# Patient Record
Sex: MALE | Race: BLACK OR AFRICAN AMERICAN | NOT HISPANIC OR LATINO | Employment: OTHER | URBAN - METROPOLITAN AREA
[De-identification: names, ages, dates, MRNs, and addresses within clinical notes are randomized per-mention and may not be internally consistent; named-entity substitution may affect disease eponyms.]

---

## 2023-10-29 ENCOUNTER — APPOINTMENT (OUTPATIENT)
Dept: CT IMAGING | Facility: CLINIC | Age: 63
End: 2023-10-29
Attending: PHYSICIAN ASSISTANT
Payer: COMMERCIAL

## 2023-10-29 ENCOUNTER — HOSPITAL ENCOUNTER (OUTPATIENT)
Facility: CLINIC | Age: 63
Setting detail: OBSERVATION
Discharge: HOME | End: 2023-10-31
Attending: EMERGENCY MEDICINE | Admitting: EMERGENCY MEDICINE
Payer: COMMERCIAL

## 2023-10-29 DIAGNOSIS — Z79.4 TYPE 2 DIABETES MELLITUS WITH HYPOGLYCEMIA WITHOUT COMA, WITH LONG-TERM CURRENT USE OF INSULIN (H): Primary | ICD-10-CM

## 2023-10-29 DIAGNOSIS — E11.649 TYPE 2 DIABETES MELLITUS WITH HYPOGLYCEMIA WITHOUT COMA, WITH LONG-TERM CURRENT USE OF INSULIN (H): Primary | ICD-10-CM

## 2023-10-29 DIAGNOSIS — E16.2 HYPOGLYCEMIA: ICD-10-CM

## 2023-10-29 DIAGNOSIS — R56.9 SEIZURE (H): ICD-10-CM

## 2023-10-29 LAB
ALBUMIN SERPL BCG-MCNC: 4.6 G/DL (ref 3.5–5.2)
ALBUMIN UR-MCNC: 10 MG/DL
ALP SERPL-CCNC: 123 U/L (ref 40–129)
ALT SERPL W P-5'-P-CCNC: 23 U/L (ref 0–70)
ANION GAP SERPL CALCULATED.3IONS-SCNC: 23 MMOL/L (ref 7–15)
APPEARANCE UR: CLEAR
AST SERPL W P-5'-P-CCNC: 16 U/L (ref 0–45)
ATRIAL RATE - MUSE: 85 BPM
BASOPHILS # BLD AUTO: 0.1 10E3/UL (ref 0–0.2)
BASOPHILS NFR BLD AUTO: 1 %
BILIRUB SERPL-MCNC: 0.3 MG/DL
BILIRUB UR QL STRIP: NEGATIVE
BUN SERPL-MCNC: 21.7 MG/DL (ref 8–23)
CALCIUM SERPL-MCNC: 9.6 MG/DL (ref 8.8–10.2)
CHLORIDE SERPL-SCNC: 100 MMOL/L (ref 98–107)
COLOR UR AUTO: ABNORMAL
CREAT SERPL-MCNC: 1.5 MG/DL (ref 0.67–1.17)
DEPRECATED HCO3 PLAS-SCNC: 17 MMOL/L (ref 22–29)
DIASTOLIC BLOOD PRESSURE - MUSE: NORMAL MMHG
EGFRCR SERPLBLD CKD-EPI 2021: 52 ML/MIN/1.73M2
EOSINOPHIL # BLD AUTO: 0.1 10E3/UL (ref 0–0.7)
EOSINOPHIL NFR BLD AUTO: 1 %
ERYTHROCYTE [DISTWIDTH] IN BLOOD BY AUTOMATED COUNT: 13.2 % (ref 10–15)
GLUCOSE BLDC GLUCOMTR-MCNC: 130 MG/DL (ref 70–99)
GLUCOSE BLDC GLUCOMTR-MCNC: 143 MG/DL (ref 70–99)
GLUCOSE SERPL-MCNC: 90 MG/DL (ref 70–99)
GLUCOSE UR STRIP-MCNC: NEGATIVE MG/DL
HCT VFR BLD AUTO: 57.6 % (ref 40–53)
HGB BLD-MCNC: 17.3 G/DL (ref 13.3–17.7)
HGB UR QL STRIP: NEGATIVE
IMM GRANULOCYTES # BLD: 0.1 10E3/UL
IMM GRANULOCYTES NFR BLD: 1 %
INTERPRETATION ECG - MUSE: NORMAL
KETONES UR STRIP-MCNC: NEGATIVE MG/DL
LEUKOCYTE ESTERASE UR QL STRIP: NEGATIVE
LYMPHOCYTES # BLD AUTO: 3.4 10E3/UL (ref 0.8–5.3)
LYMPHOCYTES NFR BLD AUTO: 31 %
MAGNESIUM SERPL-MCNC: 2.6 MG/DL (ref 1.7–2.3)
MCH RBC QN AUTO: 27.5 PG (ref 26.5–33)
MCHC RBC AUTO-ENTMCNC: 30 G/DL (ref 31.5–36.5)
MCV RBC AUTO: 92 FL (ref 78–100)
MONOCYTES # BLD AUTO: 0.8 10E3/UL (ref 0–1.3)
MONOCYTES NFR BLD AUTO: 7 %
MUCOUS THREADS #/AREA URNS LPF: PRESENT /LPF
NEUTROPHILS # BLD AUTO: 6.5 10E3/UL (ref 1.6–8.3)
NEUTROPHILS NFR BLD AUTO: 59 %
NITRATE UR QL: NEGATIVE
NRBC # BLD AUTO: 0 10E3/UL
NRBC BLD AUTO-RTO: 0 /100
P AXIS - MUSE: 55 DEGREES
PH UR STRIP: 5.5 [PH] (ref 5–7)
PLATELET # BLD AUTO: 297 10E3/UL (ref 150–450)
POTASSIUM SERPL-SCNC: 3.5 MMOL/L (ref 3.4–5.3)
PR INTERVAL - MUSE: 220 MS
PROT SERPL-MCNC: 7.6 G/DL (ref 6.4–8.3)
QRS DURATION - MUSE: 78 MS
QT - MUSE: 374 MS
QTC - MUSE: 445 MS
R AXIS - MUSE: 32 DEGREES
RBC # BLD AUTO: 6.29 10E6/UL (ref 4.4–5.9)
RBC URINE: 1 /HPF
SODIUM SERPL-SCNC: 140 MMOL/L (ref 135–145)
SP GR UR STRIP: 1.01 (ref 1–1.03)
SPERM #/AREA URNS HPF: PRESENT /HPF
SYSTOLIC BLOOD PRESSURE - MUSE: NORMAL MMHG
T AXIS - MUSE: 58 DEGREES
TROPONIN T SERPL HS-MCNC: 37 NG/L
TROPONIN T SERPL HS-MCNC: 39 NG/L
UROBILINOGEN UR STRIP-MCNC: NORMAL MG/DL
VENTRICULAR RATE- MUSE: 85 BPM
WBC # BLD AUTO: 10.9 10E3/UL (ref 4–11)
WBC URINE: 1 /HPF

## 2023-10-29 PROCEDURE — G0378 HOSPITAL OBSERVATION PER HR: HCPCS

## 2023-10-29 PROCEDURE — 99207 PR APP CREDIT; MD BILLING SHARED VISIT: CPT | Performed by: INTERNAL MEDICINE

## 2023-10-29 PROCEDURE — 85004 AUTOMATED DIFF WBC COUNT: CPT | Performed by: EMERGENCY MEDICINE

## 2023-10-29 PROCEDURE — 250N000012 HC RX MED GY IP 250 OP 636 PS 637: Performed by: PHYSICIAN ASSISTANT

## 2023-10-29 PROCEDURE — 80348 DRUG SCREENING BUPRENORPHINE: CPT | Performed by: PHYSICIAN ASSISTANT

## 2023-10-29 PROCEDURE — 82962 GLUCOSE BLOOD TEST: CPT

## 2023-10-29 PROCEDURE — 250N000013 HC RX MED GY IP 250 OP 250 PS 637: Performed by: PHYSICIAN ASSISTANT

## 2023-10-29 PROCEDURE — 80053 COMPREHEN METABOLIC PANEL: CPT | Performed by: EMERGENCY MEDICINE

## 2023-10-29 PROCEDURE — 84484 ASSAY OF TROPONIN QUANT: CPT | Performed by: EMERGENCY MEDICINE

## 2023-10-29 PROCEDURE — 81001 URINALYSIS AUTO W/SCOPE: CPT | Performed by: EMERGENCY MEDICINE

## 2023-10-29 PROCEDURE — 36415 COLL VENOUS BLD VENIPUNCTURE: CPT | Performed by: EMERGENCY MEDICINE

## 2023-10-29 PROCEDURE — 258N000001 HC RX 258

## 2023-10-29 PROCEDURE — 72125 CT NECK SPINE W/O DYE: CPT

## 2023-10-29 PROCEDURE — 70450 CT HEAD/BRAIN W/O DYE: CPT

## 2023-10-29 PROCEDURE — 250N000013 HC RX MED GY IP 250 OP 250 PS 637: Performed by: EMERGENCY MEDICINE

## 2023-10-29 PROCEDURE — 258N000001 HC RX 258: Performed by: EMERGENCY MEDICINE

## 2023-10-29 PROCEDURE — 83735 ASSAY OF MAGNESIUM: CPT | Performed by: EMERGENCY MEDICINE

## 2023-10-29 PROCEDURE — 99223 1ST HOSP IP/OBS HIGH 75: CPT | Mod: FS | Performed by: PHYSICIAN ASSISTANT

## 2023-10-29 PROCEDURE — 80355 GABAPENTIN NON-BLOOD: CPT | Performed by: PHYSICIAN ASSISTANT

## 2023-10-29 RX ORDER — LIDOCAINE 40 MG/G
CREAM TOPICAL
Status: CANCELLED | OUTPATIENT
Start: 2023-10-29

## 2023-10-29 RX ORDER — ATORVASTATIN CALCIUM 40 MG/1
40 TABLET, FILM COATED ORAL EVERY EVENING
Status: DISCONTINUED | OUTPATIENT
Start: 2023-10-30 | End: 2023-10-31 | Stop reason: HOSPADM

## 2023-10-29 RX ORDER — INSULIN GLARGINE-YFGN 100 [IU]/ML
48 INJECTION, SOLUTION SUBCUTANEOUS AT BEDTIME
Status: ON HOLD | COMMUNITY
Start: 2023-08-24 | End: 2023-10-30

## 2023-10-29 RX ORDER — VARENICLINE TARTRATE 1 MG/1
1 TABLET, FILM COATED ORAL 2 TIMES DAILY
Status: ON HOLD | COMMUNITY
Start: 2023-05-18 | End: 2023-10-30

## 2023-10-29 RX ORDER — DEXTROSE MONOHYDRATE 25 G/50ML
25-50 INJECTION, SOLUTION INTRAVENOUS
Status: DISCONTINUED | OUTPATIENT
Start: 2023-10-29 | End: 2023-10-31 | Stop reason: HOSPADM

## 2023-10-29 RX ORDER — PRAZOSIN HYDROCHLORIDE 5 MG/1
5 CAPSULE ORAL AT BEDTIME
COMMUNITY
Start: 2022-12-05

## 2023-10-29 RX ORDER — ONDANSETRON 2 MG/ML
4 INJECTION INTRAMUSCULAR; INTRAVENOUS EVERY 6 HOURS PRN
Status: CANCELLED | OUTPATIENT
Start: 2023-10-29

## 2023-10-29 RX ORDER — DEXTROSE MONOHYDRATE 25 G/50ML
INJECTION, SOLUTION INTRAVENOUS
Status: COMPLETED
Start: 2023-10-29 | End: 2023-10-29

## 2023-10-29 RX ORDER — DEXTROSE MONOHYDRATE 100 MG/ML
INJECTION, SOLUTION INTRAVENOUS ONCE
Status: COMPLETED | OUTPATIENT
Start: 2023-10-29 | End: 2023-10-29

## 2023-10-29 RX ORDER — PREDNISOLONE ACETATE 10 MG/ML
1 SUSPENSION/ DROPS OPHTHALMIC 4 TIMES DAILY
Status: DISCONTINUED | OUTPATIENT
Start: 2023-10-29 | End: 2023-10-31 | Stop reason: HOSPADM

## 2023-10-29 RX ORDER — ACETAMINOPHEN 325 MG/1
650 TABLET ORAL ONCE
Status: COMPLETED | OUTPATIENT
Start: 2023-10-29 | End: 2023-10-29

## 2023-10-29 RX ORDER — AMLODIPINE BESYLATE 10 MG/1
1 TABLET ORAL DAILY
COMMUNITY
Start: 2023-09-21

## 2023-10-29 RX ORDER — KETOROLAC TROMETHAMINE 5 MG/ML
1 SOLUTION OPHTHALMIC 4 TIMES DAILY
COMMUNITY
Start: 2022-11-28

## 2023-10-29 RX ORDER — ATORVASTATIN CALCIUM 80 MG/1
40 TABLET, FILM COATED ORAL DAILY
COMMUNITY
Start: 2023-09-21

## 2023-10-29 RX ORDER — LANOLIN ALCOHOL/MO/W.PET/CERES
2 CREAM (GRAM) TOPICAL
COMMUNITY
Start: 2022-11-15

## 2023-10-29 RX ORDER — NICOTINE POLACRILEX 4 MG
15-30 LOZENGE BUCCAL
Status: CANCELLED | OUTPATIENT
Start: 2023-10-29

## 2023-10-29 RX ORDER — GABAPENTIN 300 MG/1
300 CAPSULE ORAL 2 TIMES DAILY PRN
Status: DISCONTINUED | OUTPATIENT
Start: 2023-10-29 | End: 2023-10-31 | Stop reason: HOSPADM

## 2023-10-29 RX ORDER — DEXTROSE MONOHYDRATE 25 G/50ML
25-50 INJECTION, SOLUTION INTRAVENOUS
Status: CANCELLED | OUTPATIENT
Start: 2023-10-29

## 2023-10-29 RX ORDER — ONDANSETRON 4 MG/1
4 TABLET, ORALLY DISINTEGRATING ORAL EVERY 6 HOURS PRN
Status: CANCELLED | OUTPATIENT
Start: 2023-10-29

## 2023-10-29 RX ORDER — CARVEDILOL 25 MG/1
25 TABLET ORAL 2 TIMES DAILY
Status: DISCONTINUED | OUTPATIENT
Start: 2023-10-29 | End: 2023-10-31 | Stop reason: HOSPADM

## 2023-10-29 RX ORDER — CARVEDILOL 25 MG/1
1 TABLET ORAL 2 TIMES DAILY
COMMUNITY
Start: 2023-09-21

## 2023-10-29 RX ORDER — MIRTAZAPINE 15 MG/1
15 TABLET, FILM COATED ORAL AT BEDTIME
Status: DISCONTINUED | OUTPATIENT
Start: 2023-10-29 | End: 2023-10-31 | Stop reason: HOSPADM

## 2023-10-29 RX ORDER — TAMSULOSIN HYDROCHLORIDE 0.4 MG/1
0.4 CAPSULE ORAL DAILY
Status: DISCONTINUED | OUTPATIENT
Start: 2023-10-30 | End: 2023-10-31 | Stop reason: HOSPADM

## 2023-10-29 RX ORDER — VARENICLINE TARTRATE 1 MG/1
1 TABLET, FILM COATED ORAL 2 TIMES DAILY WITH MEALS
Status: DISCONTINUED | OUTPATIENT
Start: 2023-10-30 | End: 2023-10-30

## 2023-10-29 RX ORDER — PRAZOSIN HYDROCHLORIDE 5 MG/1
5 CAPSULE ORAL AT BEDTIME
Status: DISCONTINUED | OUTPATIENT
Start: 2023-10-29 | End: 2023-10-31 | Stop reason: HOSPADM

## 2023-10-29 RX ORDER — TAMSULOSIN HYDROCHLORIDE 0.4 MG/1
0.4 CAPSULE ORAL DAILY
COMMUNITY
Start: 2023-07-13

## 2023-10-29 RX ORDER — SPIRONOLACTONE 25 MG/1
25 TABLET ORAL DAILY
COMMUNITY
Start: 2023-09-21

## 2023-10-29 RX ORDER — NICOTINE POLACRILEX 4 MG
15-30 LOZENGE BUCCAL
Status: DISCONTINUED | OUTPATIENT
Start: 2023-10-29 | End: 2023-10-31 | Stop reason: HOSPADM

## 2023-10-29 RX ORDER — DEXTROSE MONOHYDRATE 25 G/50ML
25-50 INJECTION, SOLUTION INTRAVENOUS
Status: DISCONTINUED | OUTPATIENT
Start: 2023-10-29 | End: 2023-10-29

## 2023-10-29 RX ORDER — DEXTROSE MONOHYDRATE 25 G/50ML
25 INJECTION, SOLUTION INTRAVENOUS ONCE
Status: COMPLETED | OUTPATIENT
Start: 2023-10-29 | End: 2023-10-29

## 2023-10-29 RX ORDER — GABAPENTIN 300 MG/1
300 CAPSULE ORAL 2 TIMES DAILY PRN
COMMUNITY
Start: 2023-10-03

## 2023-10-29 RX ORDER — ACETAMINOPHEN 325 MG/1
650 TABLET ORAL EVERY 6 HOURS PRN
Status: CANCELLED | OUTPATIENT
Start: 2023-10-29

## 2023-10-29 RX ORDER — PREDNISOLONE ACETATE 10 MG/ML
1 SUSPENSION/ DROPS OPHTHALMIC 4 TIMES DAILY
COMMUNITY
Start: 2022-11-28

## 2023-10-29 RX ORDER — MIRTAZAPINE 15 MG/1
15 TABLET, FILM COATED ORAL AT BEDTIME
COMMUNITY
Start: 2022-12-05

## 2023-10-29 RX ORDER — NICOTINE POLACRILEX 4 MG
15-30 LOZENGE BUCCAL
Status: DISCONTINUED | OUTPATIENT
Start: 2023-10-29 | End: 2023-10-29

## 2023-10-29 RX ORDER — AMLODIPINE BESYLATE 10 MG/1
10 TABLET ORAL DAILY
Status: DISCONTINUED | OUTPATIENT
Start: 2023-10-30 | End: 2023-10-31 | Stop reason: HOSPADM

## 2023-10-29 RX ORDER — ACETAMINOPHEN 650 MG/1
650 SUPPOSITORY RECTAL EVERY 6 HOURS PRN
Status: CANCELLED | OUTPATIENT
Start: 2023-10-29

## 2023-10-29 RX ADMIN — PRAZOSIN HYDROCHLORIDE 5 MG: 5 CAPSULE ORAL at 21:17

## 2023-10-29 RX ADMIN — INSULIN GLARGINE 20 UNITS: 100 INJECTION, SOLUTION SUBCUTANEOUS at 22:08

## 2023-10-29 RX ADMIN — DEXTROSE MONOHYDRATE: 100 INJECTION, SOLUTION INTRAVENOUS at 10:47

## 2023-10-29 RX ADMIN — ACETAMINOPHEN 650 MG: 325 TABLET, FILM COATED ORAL at 12:44

## 2023-10-29 RX ADMIN — DEXTROSE MONOHYDRATE 25 ML: 25 INJECTION, SOLUTION INTRAVENOUS at 11:07

## 2023-10-29 RX ADMIN — MIRTAZAPINE 15 MG: 15 TABLET, FILM COATED ORAL at 21:17

## 2023-10-29 ASSESSMENT — ACTIVITIES OF DAILY LIVING (ADL)
ADLS_ACUITY_SCORE: 35

## 2023-10-29 NOTE — ED NOTES
Pt now eating meal tray and juice provided more awake and alert now alter and oriented x3 does not recall events prior to presentation to the ER

## 2023-10-29 NOTE — ED TRIAGE NOTES
"BIBA for low BS, seizure like activity.  Pt was sitting at home and spouse called 911 as patient was \"starring off into space.\"  Medics reported that BS read \"low\" and patient then had tonic/clonic seizure activity lasting less than 1 min.  D50 was administered.  On arrival, patient lethargic, opens eyes to voice.  BS on arrival 52. Confused.      Triage Assessment (Adult)       Row Name 10/29/23 1056          Triage Assessment    Airway WDL WDL        Respiratory WDL    Respiratory WDL WDL        Skin Circulation/Temperature WDL    Skin Circulation/Temperature WDL WDL        Cardiac WDL    Cardiac WDL WDL        Peripheral/Neurovascular WDL    Peripheral Neurovascular WDL WDL        Cognitive/Neuro/Behavioral WDL    Cognitive/Neuro/Behavioral WDL X                     "

## 2023-10-29 NOTE — ED NOTES
Bed: ED13  Expected date:   Expected time:   Means of arrival:   Comments:  E3  62M hypoglycemic/BS 25 D10 given/unresponsvie?  [id number]

## 2023-10-29 NOTE — ED PROVIDER NOTES
"  History     Chief Complaint:  Seizures and Hypoglycemia     The history is provided by the patient and the EMS personnel.      Nathan Bailey Jr. is a 62 year old male with history of type 2 diabetes who presents via EMS for hypoglycemia and a seizure. EMS states that his wife called 911 after she saw the patient sitting on the floor twitching and diaphoretic. She had never seen him like this and was concerned. Wife has never seen the patient have a seizure or a diabetic reaction before either.  After EMS arrived the patient looked confused and then had a tonic-clonic seizure that lasted about 1 minute. EMS reports the patient's blood glucose was \"low\", reading somewhere less than 20. They administered IV D50 which brought his blood glucose to 88. Last glucose reading was 50 prior to arrival. Patient was lethargic but responsive on arrival. They note during his seizure patient's O2 saturation was in the 70s, but that improved right away. During exam, patient denies headache, any pain, or recent illness. He did not eat anything this morning. Patient is only on insulin for his diabetes.    Independent Historian:   History provided by EMS.    Review of External Notes:   None     Medications:    Amlodipine  Lipitor  Gabapentin  Carvedilol  Insulin  Mirtazapine  Prazosin  Spironolactone  Tamsulosin  Varenicline    Past Medical History:    Type 2 Diabetes   Anxiety  Chronic back pain  Chronic PTSD  Depression  Hypertension  Cocaine dependence  Alcohol abuse  JAMIE  Tobacco dependence  Hyperglycemia    Physical Exam   Patient Vitals for the past 24 hrs:   BP Temp Temp src Pulse Resp SpO2 Height Weight   10/29/23 1145 (!) 139/98 -- -- 77 22 -- -- --   10/29/23 1130 (!) 134/99 -- -- 99 24 -- -- --   10/29/23 1046 117/67 97.1  F (36.2  C) Temporal 92 16 98 % 1.803 m (5' 11\") 96.6 kg (213 lb)      Physical Exam  Nursing note and vitals reviewed.  Constitutional:  Awake but somewhat confused initially. Cooperative.   HENT: "   Nose:    Nose normal.   Mouth/Throat:   Mucous membranes are normal.  Small abrasion to the tongue.  Eyes:    Conjunctivae normal and EOM are normal.      Pupils are equal, round, and reactive to light.   Neck:    Trachea normal.   Cardiovascular:  Normal rate, regular rhythm, normal heart sounds and normal pulses. No murmur heard.  Pulmonary/Chest:  Effort normal and breath sounds normal.   Abdominal:   Soft. Normal appearance and bowel sounds are normal.      There is no tenderness.      There is no rebound and no CVA tenderness.   Musculoskeletal:  Extremities atraumatic x 4.   Lymphadenopathy:  No cervical adenopathy.   Neurological:   Awake and alert but somewhat confused. Normal strength.      No cranial nerve deficit or sensory deficit. GCS eye subscore is 4. GCS verbal subscore is 5. GCS motor subscore is 6.   Skin:    Skin is intact. No rash noted.   Psychiatric:   Normal mood and affect.      Emergency Department Course   ECG  ECG taken at 1054, ECG read at 1100  Sinus rhythm with 1st degree A-V block   Possible Left atrial enlargement   Low voltage QRS   Septal infarct, age undetermined  Abnormal ECG   Rate 85 bpm. OH interval 220 ms. QRS duration 78 ms. QT/QTc 374/445 ms. P-R-T axes 55 32 58.     Laboratory:  Labs Ordered and Resulted from Time of ED Arrival to Time of ED Departure   COMPREHENSIVE METABOLIC PANEL - Abnormal       Result Value    Sodium 140      Potassium 3.5      Carbon Dioxide (CO2) 17 (*)     Anion Gap 23 (*)     Urea Nitrogen 21.7      Creatinine 1.50 (*)     GFR Estimate 52 (*)     Calcium 9.6      Chloride 100      Glucose 90      Alkaline Phosphatase 123      AST 16      ALT 23      Protein Total 7.6      Albumin 4.6      Bilirubin Total 0.3     MAGNESIUM - Abnormal    Magnesium 2.6 (*)    TROPONIN T, HIGH SENSITIVITY - Abnormal    Troponin T, High Sensitivity 39 (*)    ROUTINE UA WITH MICROSCOPIC REFLEX TO CULTURE - Abnormal    Color Urine Light Yellow      Appearance Urine  Clear      Glucose Urine Negative      Bilirubin Urine Negative      Ketones Urine Negative      Specific Gravity Urine 1.013      Blood Urine Negative      pH Urine 5.5      Protein Albumin Urine 10 (*)     Urobilinogen Urine Normal      Nitrite Urine Negative      Leukocyte Esterase Urine Negative      Mucus Urine Present (*)     Sperm Urine Present (*)     RBC Urine 1      WBC Urine 1     CBC WITH PLATELETS AND DIFFERENTIAL - Abnormal    WBC Count 10.9      RBC Count 6.29 (*)     Hemoglobin 17.3      Hematocrit 57.6 (*)     MCV 92      MCH 27.5      MCHC 30.0 (*)     RDW 13.2      Platelet Count 297      % Neutrophils 59      % Lymphocytes 31      % Monocytes 7      % Eosinophils 1      % Basophils 1      % Immature Granulocytes 1      NRBCs per 100 WBC 0      Absolute Neutrophils 6.5      Absolute Lymphocytes 3.4      Absolute Monocytes 0.8      Absolute Eosinophils 0.1      Absolute Basophils 0.1      Absolute Immature Granulocytes 0.1      Absolute NRBCs 0.0     TROPONIN T, HIGH SENSITIVITY - Abnormal    Troponin T, High Sensitivity 37 (*)    GLUCOSE MONITOR NURSING POCT   GLUCOSE MONITOR NURSING POCT   GLUCOSE MONITOR NURSING POCT      Emergency Department Course & Assessments:  Interventions:  Medications   dextrose 10% infusion ( Intravenous $New Bag 10/29/23 1047)   dextrose 50 % injection 25 mL (25 mLs Intravenous $Given 10/29/23 1107)   acetaminophen (TYLENOL) tablet 650 mg (650 mg Oral $Given 10/29/23 1244)        Assessments:  1040 I obtained history and examined the patient as noted above.  1230 I rechecked and updated the patient    Independent Interpretation (X-rays, CTs, rhythm strip):  None    Consultations/Discussion of Management or Tests:   ED Course as of 10/29/23 1637   Sun Oct 29, 2023   1245 Consult with Taryn Roman internal medicine PA-C     Social Determinants of Health affecting care:   None    Disposition:  The patient was admitted to the hospital under the care of Dr. Pritchard.      Impression & Plan    Medical Decision Making:  This is a 62-year-old male with a history of diabetes who was brought in by ambulance after he had a significant hypoglycemic event and subsequent seizure as a result.  Unfortunately, his blood sugar did continue to drop here even after receiving a D10 infusion.  I continued to provide him D10 through his IV as well as a dose of IV dextrose.  At this point I do not feel that he is safe for discharge, as I think he needs to be closely monitored.  Therefore we will bring him into the hospital.  I subsequently spoke with Taryn Roman, the PA working with the hospitalist service, who will be taking care of him.      Diagnosis:    ICD-10-CM    1. Hypoglycemia  E16.2       2. Seizure (H)  R56.9          Scribe Disclosure:  I, Jasmin Ramírez, am serving as a scribe at 12:21 PM on 10/29/2023 to document services personally performed by Jaylon Nevarez MD based on my observations and the provider's statements to me.     10/29/2023   Jaylon Nevarez MD Lashkowitz, Seth H, MD  10/29/23 [id number]

## 2023-10-29 NOTE — PHARMACY-ADMISSION MEDICATION HISTORY
Pharmacy Intern Admission Medication History    Admission medication history is complete. The information provided in this note is only as accurate as the sources available at the time of the update.    Information Source(s): Patient via in-person    Pertinent Information: Updated med list per patient's testimony.   Patient doesn't remember if he took his oral meds this morning.   Spouse saw patient take his insulin dose today.     Changes made to PTA medication list:  Added: All the meds   Deleted: None  Changed: None    Allergies reviewed with patient and updates made in EHR: no    Medication History Completed By: Yaw Edwards 10/29/2023 2:47 PM    PTA Med List   Medication Sig Last Dose    amLODIPine (NORVASC) 10 MG tablet Take 1 tablet by mouth daily     atorvastatin (LIPITOR) 80 MG tablet Take 40 mg by mouth daily     carvedilol (COREG) 25 MG tablet Take 1 tablet by mouth 2 times daily     gabapentin (NEURONTIN) 300 MG capsule Take 300 mg by mouth 2 times daily as needed for neuropathic pain     Insulin Glargine-yfgn 100 UNIT/ML SOPN Inject 48 Units Subcutaneous at bedtime 10/29/2023    ketorolac (ACULAR) 0.5 % ophthalmic solution Place 1 drop into both eyes 4 times daily     melatonin 3 MG tablet Take 2 tablets by mouth nightly as needed for sleep     mirtazapine (REMERON) 15 MG tablet Take 15 mg by mouth at bedtime     prazosin (MINIPRESS) 5 MG capsule Take 5 mg by mouth at bedtime     prednisoLONE acetate (PRED FORTE) 1 % ophthalmic suspension Place 1 drop into both eyes 4 times daily     spironolactone (ALDACTONE) 25 MG tablet Take 25 mg by mouth daily     tamsulosin (FLOMAX) 0.4 MG capsule Take 0.4 mg by mouth daily     varenicline (CHANTIX) 1 MG tablet Take 1 mg by mouth 2 times daily

## 2023-10-29 NOTE — H&P
[facility]    History and Physical - Hospitalist Service       Date of Admission:  10/29/2023    Assessment & Plan   Nathan Bailey Jr. is a 62 year-old male with past medical history significant for insulin-dependent diabetes mellitus, hypertension, BPH, PTSD and dyslipidemia who presented to  ED this morning with concerns of hypoglycemia and seizure. [facility] glucose [facility], seizure precautions, Neurology consult and CD consult.    Hypoglycemia  Insulin dependent type 2 diabetes  * PTA Regimen: insulin glargine 48-49u at bedtime.   * Last HbA1c 5.5 on 9/21/2023.  - D5 NS for 8 hours  - Hypoglycemia protocol  - Preprandial and HS fingerstick checks or Q 4 hours while NPO  - Sliding scale insulin Low   - Consistent CHO diet - 60gm per meal    Seizure   Unwitnessed Fall  * Likely related to hypoglycemic event.  - STAT head CT and C-spine CT pending  - EEG  - Seizure precautions  - Q4h neuro checks  - Neurology consult, appreciate recommendations  - Ambulate with assist, defer pt/ot for now     Acute Kidney Injury on CKD  * Suspect 2/2 poor oral intake  - Baseline Cr appears ~1.3. On admission, 1.50.  - Avoid nephrotoxins - contrast, NSAIDs  - Renally dose medications as able/needed  - Continue gentle IVF, encourage PO intake  - Monitor with repeat in morning    Elevated Troponin  * 39 on admit, suspect mild elevation related to demand.  * EKG with sinus rhythm with 1st degree AV block, rate 85, , Qtc 445, no obvious ST elevation.  -  Patient asymptomatic, denies cardiac symptoms  - Monitor on telemetry 24h    Chronic stable diagnoses and other pertinent medical history:  Appropriate PTA medications will be resumed  Hypertension - continue PTA Amlodipine upon med rec completion   Hyperlipidemia - continue PTA Atorvastatin upon med rec completion  BPH - continue PTA Flomax upon med rec completion  PTSD/Depression/Anxiety -  "continue PTA Prazosin at bedtime   CHF - continue PTA Spironolactone upon med rec completion  Tobacco use disorder - awareness, replace PRN  Diabetic Neuropathy - at baseline, continue PTA Gabapentin upon med rec completion  Substance use disorder (cocaine) - CD consult tomorrow    Diet: Moderate Consistent Carb (60 g CHO per Meal) Diet  DVT Prophylaxis: Low Risk/Ambulatory with no VTE prophylaxis indicated  Villaseñor Catheter: Not present  Lines: None     Cardiac Monitoring: None  Code Status:  Full    Clinically Significant Risk Factors Present on Admission             # Anion Gap Metabolic Acidosis: Highest Anion Gap = 23 mmol/L in last 2 days, will monitor and treat as appropriate      # Hypertension: Home medication list includes antihypertensive(s)      # Overweight: Estimated body mass index is 29.71 kg/m  as calculated from the following:    Height as of this encounter: 1.803 m (5' 11\").    Weight as of this encounter: 96.6 kg (213 lb).              Disposition Plan      Expected Discharge Date: 10/30/2023                The patient's care was discussed with the Attending Physician, Dr. Yañez .    Taryn Roman PA-C  Hospitalist Service  [facility]  Securely message with [employer] (more info)  Text page via [facility] Paging/Directory     ______________________________________________________________________    Chief Complaint   Hypoglycemia, seizure    History is obtained from the patient, electronic health record, emergency department physician, and patient's significant other    History of Present Illness   Nathan Bailey Jr. is a 62 year-old male with past medical history significant for insulin-dependent diabetes mellitus, hypertension, BPH, PTSD and dyslipidemia who presented to  ED this morning with concerns of hypoglycemia and seizure. Patient amnesic to events prior to hospital arrival. Per families report, patient's wife called EMS after she saw patient begin twitching and " became diaphoretic. Patient has never had a seizure before. Upon EMS arrival, patient had tonic-clonic seizure that lasted approximately one minute. Blood glucose at that time was ~20. D50 was administered and recheck blood glucose was 88. Upon arrival to the ED patient was lethargic but more responsive. Patient has eating two tray meals upon arrival, also received more D50 as well as D10. Patient did not eat anything this morning, he did take his insulin last night as he normally does. He denies recent illness or fevers, chills. No recent nausea, vomiting, diarrhea or chest pain/difficulties breathing. Of note, patient does endorse to cocaine use two nights ago. He denies alcohol use. He is weaning off cigarettes, denies replacement. He does become emotional, he wants to improve and be healthy. He denies other drug use. Emotional support provided.    BMP grossly normal, mild HERNÁN on CKD. CBC within normal limits.    Troponin 39. EKG with sinus rhythm with 1st degree AV block, rate 85, , Qtc 445, no obvious ST elevation.    Past Medical History    Type 2 Diabetes   Anxiety  Chronic back pain  Chronic PTSD  Depression  Hypertension  Cocaine dependence  Alcohol abuse  JAMIE  Tobacco dependence  Hyperglycemia    Past Surgical History   Patient denies past surgeries.    Prior to Admission Medications   Prior to Admission Medications   Prescriptions Last Dose Informant Patient Reported? Taking?   Insulin Glargine-yfgn 100 UNIT/ML SOPN 10/29/2023  Yes Yes   Sig: Inject 48-49 Units Subcutaneous at bedtime   amLODIPine (NORVASC) 10 MG tablet   Yes Yes   Sig: Take 1 tablet by mouth daily   atorvastatin (LIPITOR) 80 MG tablet   Yes Yes   Sig: Take 40 mg by mouth daily   carvedilol (COREG) 25 MG tablet   Yes Yes   Sig: Take 1 tablet by mouth 2 times daily   gabapentin (NEURONTIN) 300 MG capsule   Yes Yes   Sig: Take 300 mg by mouth 2 times daily as needed for neuropathic pain   ketorolac (ACULAR) 0.5 % ophthalmic solution    Yes Yes   Sig: Place 1 drop into both eyes 4 times daily   melatonin 3 MG tablet   Yes Yes   Sig: Take 2 tablets by mouth nightly as needed for sleep   mirtazapine (REMERON) 15 MG tablet   Yes Yes   Sig: Take 15 mg by mouth at bedtime   prazosin (MINIPRESS) 5 MG capsule   Yes Yes   Sig: Take 5 mg by mouth at bedtime   prednisoLONE acetate (PRED FORTE) 1 % ophthalmic suspension   Yes Yes   Sig: Place 1 drop into both eyes 4 times daily   spironolactone (ALDACTONE) 25 MG tablet   Yes Yes   Sig: Take 25 mg by mouth daily   tamsulosin (FLOMAX) 0.4 MG capsule   Yes Yes   Sig: Take 0.4 mg by mouth daily   varenicline (CHANTIX) 1 MG tablet   Yes Yes   Sig: Take 1 mg by mouth 2 times daily      Facility-Administered Medications: None       Physical Exam   Vital Signs: Temp: 97.1  F (36.2  C) Temp src: Temporal BP: (!) 139/98 Pulse: 77   Resp: 22 SpO2: 98 % O2 Device: None (Room air)    Weight: 213 lbs 0 oz   General: Awake, alert, 61 yo male who appears stated age sitting upright in chair. Looks comfortable. No acute distress.  HEENT: Normocephalic, mild headache. Extraocular movements intact. Pupils equal round and reactive to light bilaterally.   Respiratory: Clear to auscultation bilaterally, no rales, wheezing, or rhonchi.  Cardiovascular: Regular rate and rhythm, +S1 and S2, no murmur auscultated. No peripheral edema.   Gastrointestinal: Soft, non-tender, non-distended. Bowel sounds present.  Skin: Warm, dry. No obvious rashes or lesions on exposed skin. Dorsalis pedis pulses palpable bilaterally.  Musculoskeletal: No joint swelling, erythema or tenderness. Moves all extremities equally.  Neurologic: AAO x3. Cranial nerves 2-12 grossly intact, normal strength and sensation. Chronic neuropathy ble  Psychiatric: Appropriate mood and affect. No obvious anxiety or depression.      Medical Decision Making   45 MINUTES SPENT BY ME on the date of service doing chart review, history, exam, documentation & further activities  per the note.      Data   ------------------------- PAST 24 HR DATA REVIEWED -----------------------------------------------    I have personally reviewed the following data over the past 24 hrs:    10.9  \   17.3   / 297     140 100 21.7 /  90   3.5 17 (L) 1.50 (H) \     ALT: 23 AST: 16 AP: 123 TBILI: 0.3   ALB: 4.6 TOT PROTEIN: 7.6 LIPASE: N/A     Trop: 39 (H) BNP: N/A       Imaging results reviewed over the past 24 hrs:   No results found for this or any previous visit (from the past 24 hour(s)).

## 2023-10-29 NOTE — PROGRESS NOTES
RECEIVING UNIT ED HANDOFF REVIEW    ED Nurse Handoff Report was reviewed by: Jamaica Wade RN on October 29, 2023 at 5:58 PM

## 2023-10-29 NOTE — ED NOTES
"[facility]  ED Nurse Handoff Report    ED Chief complaint: Seizures and Hypoglycemia      ED Diagnosis:   Final diagnoses:   None       Code Status: Full Code    Allergies: No Known Allergies    Patient Story: pt presents from home where wife found him on the ground staring off and not making sense. Wife called ems. Ems found pt and pt was holding a cupcake and asked if he was diabetic he told them he was and took BG and found to be in low on ems sensor  Focused Assessment:  pt arrived confused with BG 38. Pt given 25 ml D50 and BG barbara to 58. Pt ate x2 meal tray and x4 juice with maintenance infusion of d10 Bg now up to 125    Pt alert oriented but does not remember the events prior to arrival. Wife informs staff she saw pt give himself insulin this morning but does not  kn ow the dosage he administered. Pt states he administer 48 to 49 units but does not remember giving himself insulin today.     Treatments and/or interventions provided: D10 tylenol blood work   Patient's response to treatments and/or interventions: denies pain pt more awake and alert     To be done/followed up on inpatient unit:  diabetes education     Does this patient have any cognitive concerns?: Disoriented to situation    Activity level - Baseline/Home:  Unknown  Activity Level - Current:   Stand with assist x2    Patient's Preferred language: English   Needed?: No    Isolation: None  Infection: Not Applicable  Patient tested for COVID 19 prior to admission: NO  Bariatric?: No    Vital Signs:   Vitals:    10/29/23 1046 10/29/23 1130 10/29/23 1145   BP: 117/67 (!) 134/99 (!) 139/98   Pulse: 92 99 77   Resp: 16 24 22   Temp: 97.1  F (36.2  C)     TempSrc: Temporal     SpO2: 98%     Weight: 96.6 kg (213 lb)     Height: 1.803 m (5' 11\")         Cardiac Rhythm:     Was the PSS-3 completed:   Yes  What interventions are required if any?               Family Comments: concern for low BG   OBS brochure/video " discussed/provided to patient/family: No              Name of person given brochure if not patient:               Relationship to patient: wife    For the majority of the shift this patient's behavior was Green.   Behavioral interventions performed were none .    ED NURSE PHONE NUMBER: ED RN

## 2023-10-30 ENCOUNTER — HOSPITAL ENCOUNTER (OUTPATIENT)
Dept: NEUROLOGY | Facility: CLINIC | Age: 63
Setting detail: OBSERVATION
Discharge: HOME | End: 2023-10-30
Attending: PHYSICIAN ASSISTANT
Payer: COMMERCIAL

## 2023-10-30 ENCOUNTER — APPOINTMENT (OUTPATIENT)
Dept: MRI IMAGING | Facility: CLINIC | Age: 63
End: 2023-10-30
Attending: PHYSICIAN ASSISTANT
Payer: COMMERCIAL

## 2023-10-30 PROBLEM — F32.A DEPRESSIVE DISORDER: Status: ACTIVE | Noted: 2023-10-30

## 2023-10-30 PROBLEM — F14.10 COCAINE SUBSTANCE ABUSE (H): Status: ACTIVE | Noted: 2023-10-30

## 2023-10-30 PROBLEM — G89.29 CHRONIC BACK PAIN: Status: ACTIVE | Noted: 2023-10-30

## 2023-10-30 PROBLEM — F43.12 CHRONIC POST-TRAUMATIC STRESS DISORDER (PTSD): Status: ACTIVE | Noted: 2023-10-30

## 2023-10-30 PROBLEM — I10 HYPERTENSION: Status: ACTIVE | Noted: 2023-10-30

## 2023-10-30 PROBLEM — F41.9 ANXIETY: Status: ACTIVE | Noted: 2023-10-30

## 2023-10-30 PROBLEM — E11.649 TYPE 2 DIABETES MELLITUS WITH HYPOGLYCEMIA WITHOUT COMA, WITH LONG-TERM CURRENT USE OF INSULIN (H): Status: ACTIVE | Noted: 2023-10-30

## 2023-10-30 PROBLEM — Z79.4 TYPE 2 DIABETES MELLITUS WITH HYPOGLYCEMIA WITHOUT COMA, WITH LONG-TERM CURRENT USE OF INSULIN (H): Status: ACTIVE | Noted: 2023-10-30

## 2023-10-30 PROBLEM — M54.9 CHRONIC BACK PAIN: Status: ACTIVE | Noted: 2023-10-30

## 2023-10-30 PROBLEM — G47.33 OBSTRUCTIVE SLEEP APNEA SYNDROME: Status: ACTIVE | Noted: 2023-10-30

## 2023-10-30 LAB
ANION GAP SERPL CALCULATED.3IONS-SCNC: 10 MMOL/L (ref 7–15)
BUN SERPL-MCNC: 18.1 MG/DL (ref 8–23)
CALCIUM SERPL-MCNC: 9.4 MG/DL (ref 8.8–10.2)
CHLORIDE SERPL-SCNC: 104 MMOL/L (ref 98–107)
CREAT SERPL-MCNC: 1.31 MG/DL (ref 0.67–1.17)
CREAT UR-MCNC: 99 MG/DL
DEPRECATED HCO3 PLAS-SCNC: 26 MMOL/L (ref 22–29)
EGFRCR SERPLBLD CKD-EPI 2021: 62 ML/MIN/1.73M2
GLUCOSE BLDC GLUCOMTR-MCNC: 113 MG/DL (ref 70–99)
GLUCOSE BLDC GLUCOMTR-MCNC: 125 MG/DL (ref 70–99)
GLUCOSE BLDC GLUCOMTR-MCNC: 128 MG/DL (ref 70–99)
GLUCOSE BLDC GLUCOMTR-MCNC: 157 MG/DL (ref 70–99)
GLUCOSE BLDC GLUCOMTR-MCNC: 38 MG/DL (ref 70–99)
GLUCOSE BLDC GLUCOMTR-MCNC: 52 MG/DL (ref 70–99)
GLUCOSE BLDC GLUCOMTR-MCNC: 52 MG/DL (ref 70–99)
GLUCOSE BLDC GLUCOMTR-MCNC: 56 MG/DL (ref 70–99)
GLUCOSE BLDC GLUCOMTR-MCNC: 78 MG/DL (ref 70–99)
GLUCOSE BLDC GLUCOMTR-MCNC: 82 MG/DL (ref 70–99)
GLUCOSE BLDC GLUCOMTR-MCNC: 92 MG/DL (ref 70–99)
GLUCOSE SERPL-MCNC: 129 MG/DL (ref 70–99)
POTASSIUM SERPL-SCNC: 4.3 MMOL/L (ref 3.4–5.3)
SODIUM SERPL-SCNC: 140 MMOL/L (ref 135–145)

## 2023-10-30 PROCEDURE — G0378 HOSPITAL OBSERVATION PER HR: HCPCS

## 2023-10-30 PROCEDURE — 36415 COLL VENOUS BLD VENIPUNCTURE: CPT | Performed by: PHYSICIAN ASSISTANT

## 2023-10-30 PROCEDURE — 250N000013 HC RX MED GY IP 250 OP 250 PS 637: Performed by: PHYSICIAN ASSISTANT

## 2023-10-30 PROCEDURE — 80048 BASIC METABOLIC PNL TOTAL CA: CPT | Performed by: PHYSICIAN ASSISTANT

## 2023-10-30 PROCEDURE — 70551 MRI BRAIN STEM W/O DYE: CPT

## 2023-10-30 PROCEDURE — 95816 EEG AWAKE AND DROWSY: CPT

## 2023-10-30 PROCEDURE — 99207 PR NO BILLABLE SERVICE THIS VISIT: CPT | Performed by: PHYSICIAN ASSISTANT

## 2023-10-30 PROCEDURE — 82962 GLUCOSE BLOOD TEST: CPT

## 2023-10-30 PROCEDURE — 99238 HOSP IP/OBS DSCHRG MGMT 30/<: CPT | Performed by: PHYSICIAN ASSISTANT

## 2023-10-30 PROCEDURE — 250N000013 HC RX MED GY IP 250 OP 250 PS 637: Performed by: HOSPITALIST

## 2023-10-30 PROCEDURE — 250N000009 HC RX 250: Performed by: PHYSICIAN ASSISTANT

## 2023-10-30 RX ORDER — LANCETS
EACH MISCELLANEOUS
Qty: 100 EACH | Refills: 6 | Status: SHIPPED | OUTPATIENT
Start: 2023-10-30

## 2023-10-30 RX ORDER — LORAZEPAM 0.5 MG/1
0.5 TABLET ORAL ONCE
Status: COMPLETED | OUTPATIENT
Start: 2023-10-30 | End: 2023-10-30

## 2023-10-30 RX ORDER — INSULIN GLARGINE-YFGN 100 [IU]/ML
30 INJECTION, SOLUTION SUBCUTANEOUS AT BEDTIME
Start: 2023-10-30 | End: 2023-10-31

## 2023-10-30 RX ADMIN — CARVEDILOL 25 MG: 25 TABLET, FILM COATED ORAL at 08:00

## 2023-10-30 RX ADMIN — AMLODIPINE BESYLATE 10 MG: 10 TABLET ORAL at 08:00

## 2023-10-30 RX ADMIN — PREDNISOLONE ACETATE 1 DROP: 10 SUSPENSION/ DROPS OPHTHALMIC at 22:32

## 2023-10-30 RX ADMIN — PRAZOSIN HYDROCHLORIDE 5 MG: 5 CAPSULE ORAL at 22:30

## 2023-10-30 RX ADMIN — VARENICLINE TARTRATE 1 MG: 1 TABLET, FILM COATED ORAL at 08:00

## 2023-10-30 RX ADMIN — TAMSULOSIN HYDROCHLORIDE 0.4 MG: 0.4 CAPSULE ORAL at 08:00

## 2023-10-30 RX ADMIN — PREDNISOLONE ACETATE 1 DROP: 10 SUSPENSION/ DROPS OPHTHALMIC at 11:09

## 2023-10-30 RX ADMIN — PREDNISOLONE ACETATE 1 DROP: 10 SUSPENSION/ DROPS OPHTHALMIC at 17:42

## 2023-10-30 RX ADMIN — INSULIN GLARGINE 20 UNITS: 100 INJECTION, SOLUTION SUBCUTANEOUS at 22:30

## 2023-10-30 RX ADMIN — CARVEDILOL 25 MG: 25 TABLET, FILM COATED ORAL at 20:10

## 2023-10-30 RX ADMIN — MIRTAZAPINE 15 MG: 15 TABLET, FILM COATED ORAL at 22:30

## 2023-10-30 RX ADMIN — LORAZEPAM 0.5 MG: 0.5 TABLET ORAL at 19:54

## 2023-10-30 RX ADMIN — ATORVASTATIN CALCIUM 40 MG: 40 TABLET, FILM COATED ORAL at 20:10

## 2023-10-30 ASSESSMENT — ACTIVITIES OF DAILY LIVING (ADL)
ADLS_ACUITY_SCORE: 35
ADLS_ACUITY_SCORE: 37
ADLS_ACUITY_SCORE: 35

## 2023-10-30 NOTE — PROGRESS NOTES
Counts include 234 beds at the [facility] EEG #  portable, ordered by Taryn Roman PA-C.    Pt is awake, cooperative, can follow comands.   Briefly drowsy on EEG.   Photic stimulation performed.

## 2023-10-30 NOTE — PROGRESS NOTES
Care Coordination:    -writer was notified that [facility] is concerned [facility] not [facility] and [facility]. [facility] called [facility], Writer notified them of admission and was given a Reference Notification number of [id number]. They have been notified of emergency/911 call and nearest hospital. Number given to patient by bedside RN for patient to check tomorrow on coverage.    Heavenly Braxton RN  BSN, Care Coordinator    [facility]/ Care Transitions          [email or web address]

## 2023-10-30 NOTE — PLAN OF CARE
Observation goals  PRIOR TO DISCHARGE        -diagnostic tests and consults completed and resulted- not met  -vital signs normal or at patient baseline- met  -tolerating oral intake to maintain hydration- Met  -adequate pain control on oral analgesics- denies, met  -stable blood glucose values- met   Nurse to notify provider when observation goals have been met and patient is ready for discharge.

## 2023-10-30 NOTE — PLAN OF CARE
Observation goals  PRIOR TO DISCHARGE        -diagnostic tests and consults completed and resulted- not met  -vital signs normal or at patient baseline- met  -tolerating oral intake to maintain hydration- Met  -adequate pain control on oral analgesics- not met  -stable blood glucose values- met   Nurse to notify provider when observation goals have been met and patient is ready for discharge.

## 2023-10-30 NOTE — PROGRESS NOTES
Observation goals  PRIOR TO DISCHARGE        -diagnostic tests and consults completed and resulted- not met  -vital signs normal or at patient baseline- met  -tolerating oral intake to maintain hydration- Met  -adequate pain control on oral analgesics- denies  -stable blood glucose values- met Bg 130, 143, 92  Nurse to notify provider when observation goals have been met and patient is ready for discharge.

## 2023-10-30 NOTE — PROGRESS NOTES
Observation goals  PRIOR TO DISCHARGE        -diagnostic tests and consults completed and resulted- not met  -vital signs normal or at patient baseline- met  -tolerating oral intake to maintain hydration- Met  -adequate pain control on oral analgesics- denies  -stable blood glucose values- partially met Bg 130, 143  Nurse to notify provider when observation goals have been met and patient is ready for discharge.

## 2023-10-30 NOTE — PROGRESS NOTES
Attempted to call again phone remains busy.   JEFFRY Woodward on 10/30/2023 at 10:21 AM      Attempted to call and talk with patient to discuss possible treatment options and to possibly complete an assessment. Attempted multiple times and received a busy signal every time. Will try again later today.  Talk to staff who stated patient has been really busy with phone calls.     JEFFRY Woodward on 10/30/2023 at 9:21 AM

## 2023-10-30 NOTE — PLAN OF CARE
Goal Outcome Evaluation:  PRIMARY Concern: Fall, Hypoglycemia, Seizure  SAFETY RISK Concerns (fall risk, behaviors, etc.): Behavior- green      Isolation/Type: None  Tests/Procedures for NEXT shift: consult  Consults? (Pending/following, signed-off?) Neurology consult  Where is patient from? (Home, TCU, etc.): Home  Other Important info for NEXT shift:   Anticipated DC date & active delays: TBD  _____________________________________________________________________________  SUMMARY NOTE:     Orientation/Cognitive: A&Ox4; pt has been forgetting mid sentence what he wants to say  Observation Goals (Met/ Not Met): Not Met  Mobility Level/Assist Equipment: IND  Antibiotics & Plan (IV/po, length of tx left): None  Pain Management: Denies pain  Tele/VS/O2: VSS on RA  ABNL Lab/BG: , 143, 92  Diet: Mod carb diet  Bowel/Bladder: Cont. B&B  Skin Concerns: n/a  Drains/Devices: PIV SL  Patient Stated Goal for Today: To get better

## 2023-10-30 NOTE — PROGRESS NOTES
[facility]  Hospitalist Progress Note    Assessment & Plan   Nathan Bailey Jr. is a 62 year old male, w a PMH ofM2, HTN, HLD, BPH, PTSA, Anxiety, chronic low back pain, s.p surgery, cocaine abuse (sober 2022), who was admitted on 10/29/2023 after a seizure in setting of severe hypoglycemia      Principal Problem:    Hypoglycemia, resolved,     Type 2 diabetes mellitus with hypoglycemia without coma, with long-term current use of insulin (H)    Patient with seizure event, possibly related to hypoglycemia (BG<20 per EMS, given D50 in the field). No previous sz hx. Possible that use of cocaine 1d before also contrib to hypoglycemia + sz, with suspicion that high lose of lantus (PTA 50u HS x 3mo) was the major cause  of the low too. Note insulin to high w Last HbA1c 5.5 on 9/21/2023.  Pt reported history of vertigo had another unusual spell around a month ago reported though that didn't have such classic seizure description.  Head CT w old infarcts (not prev mentioned, pt denied known hx of CVA or sx),  s/p EEG w/o any sz activity.  Neurology consult ordered (late) 10/30. Will get MRI B to eval old CVA, r/o other pathology. We  have advised the patient he should not drive at discharge immediately given the recent seizure, he will need outpatient follow-up to consider when safe to resume driving. Home lantus resumed at 10u (PAT 50u), and  glu > 400 in ED after PTA d50m and ate + given IV glucose.  IV glucose stopped.  - obs, consider UM review for inpt status  - PTA lantus decr to 10u HS for now,   - SSI added here (not using at home)  - needs home glucometer replaced, his unit is faulty  - Carb controlled  - at home will likely need to slow incr lantus, max 40u (for incr A1c)    Seizure (H), resolved  Fall  Sz at home, witnessed by wife, contrib likely mainly due to severe hypoglycemia and cocaine use 1d before . None here, EEG read as WNL (by Dr Link, who will see pt tomorrow in  consult. CTH w old infarcts suspected, age indeterminate lacunar infarcts in the right caudate and superior cerebellum. . Will get MRI Brain, neuro consulted tonight, to see in am  - Neurology consulted  - head CT and C-spine CT w/o acute injury  - MRI Brain ordered  - avoid cocaine  - start ASA 81  - may incr PTA statin to high intensity    Acute Kidney Injury on CKD  * Suspect 2/2 poor oral intake, plus PTA aldactone  Baseline Cr appears ~1.3. On admission, 1.50.   10/30- Cr 1.3, Improved   - Avoid nephrotoxins - contrast, NSAIDs  - Renally dose medications as able/needed  - stop IVF, encourage PO intake  - Monitor with repeat in morning     Elevated Troponin  * 39 on admit, -->37 suspect mild elevation related to demand.  * EKG with sinus rhythm with 1st degree AV block, rate 85, , Qtc 445, no obvious ST elevation.  -  Patient asymptomatic, denies cardiac symptoms  - on telemetry 24h  - outpt PCP to consider stress test/risk stratification     CHF -   - PTA coreg resumed  - resume PTA Spironolactone on d.c (w stable Cr)    Hypertension  Overall well controlled  - resume PTA Amlodipine     HLD  - PTA atorvastatin, may need to incr dose     BPH   - continue PTA Flomax     Tobacco use disorder -   - pt quit smoking 3mo agio  - using nicotine liz, replacements often PRN    Diabetic Neuropathy -   -at baseline, continue PTA Gabapentin     Substance use disorder (cocaine) -  - heavy cocaine use until 2022 sobriety, relapsed ~10/27 at [facility] w old GI friends, , is remorseful re relapse. Could be assoc w sz/low BG too  - s/p  CD consult - pt declined tx  - pt will incr PTA NA meetings frequency  - cessation encouraged    Anxiety, PTSD  - PTA prazosin  - PTA mirtazapine  - PCP to consider Cymbalta outpt given chronic low back pain    Chronic post- low back pain, s/p prev surgery  - PCP f/u    Glaucoma?  - PTA eyedrops resumed    Elevated Hgb, 17.3  - recheck CBC  - r/o polycythemia w PCP      Clinically Significant  "Risk Factors Present on Admission             # Anion Gap Metabolic Acidosis: Highest Anion Gap = 23 mmol/L in last 2 days, will monitor and treat as appropriate      # Hypertension: Noted on problem list      # Overweight: Estimated body mass index is 29.71 kg/m  as calculated from the following:    Height as of this encounter: 1.803 m (5' 11\").    Weight as of this encounter: 96.6 kg (213 lb).                Diet: Moderate Consistent Carb (60 g CHO per Meal) Diet  Diet     DVT Prophylaxis: Low Risk/Ambulatory with no VTE prophylaxis indicated and Ambulate every shift   Villaseñor Catheter: Not present  Lines: None     Cardiac Monitoring: None  Code Status:  Full    Disposition Plan       Expected Discharge Date: 10/31/2023        Discharge Comments: Neuro consult  EEG  BS q4 hrs  CD consult-cocaine abuse      Entered: Evgeny Carrizales PA-C 10/30/2023, 5:06 PM        The patient has been discussed with Dr. Khan, who agrees with the assessment and plan at this time.    Evgeny Carrizales PA-C  [facility]  Securely message with the Vocera Web Console (learn more here)  Text page via [employer] Paging/Directory      Interval History     Long clarification of hx today, summarized in plan  Asx now, no sx here. No past sz.  Thinks lantus has been too high, new [facility] MD incr the dose last month from 50-->50u HS  Worrisome, never felt any sx when very low   Will ask PCP if continuous glucometer may help.   Current glucometer very unreliable/glitchy per pt, needs new  Remorseful when he relapsed this past week, succumbed to peer pressure, despite 1.5 yrs of sobriety from heavy cocaine prior,   No CVA sx before or now, ? CVA signs related to past cocaine use +/- HTN  NO CP, SOB, other sx        Physical Exam   Temp: 97.8  F (36.6  C) Temp src: Oral BP: (!) 140/81 Pulse: 65   Resp: 18 SpO2: 96 % O2 Device: None (Room air)    Vitals:    10/29/23 1046   Weight: 96.6 kg (213 lb)     Vital Signs with " Ranges  Temp:  [97.8  F (36.6  C)-98.3  F (36.8  C)] 97.8  F (36.6  C)  Pulse:  [62-77] 65  Resp:  [18-20] 18  BP: (102-143)/(50-97) 140/81  SpO2:  [96 %-99 %] 96 %  I/O last 3 completed shifts:  In: 360 [P.O.:360]  Out: -   Constitutional: Awake, alert, cooperative, no apparent distress  ENT: Normocephalic, without obvious abnormality, atraumatic, oral pharynx with moist mucus membranes, tonsils without erythema or exudates.  Neck: no jvd  Pulmonary: No increased work of breathing, good air exchange, clear to auscultation bilaterally, no crackles or wheezing.  Cardiovascular: Regular rate and rhythm, normal S1 and S2, no S3 or S4, and no murmur noted.  GI: Normal bowel sounds, soft, non-distended, non-tender.  Skin/Integumen: Visualized skin appeared clear.  Neuro: CN II-XII grossly intact.  Psych:  Alert and oriented x 3. Normal affect.  Extremities: No lower extremity edema noted, and calves are non-TTP bilaterally.     Meds:  Medications      amLODIPine  10 mg Oral Daily    atorvastatin  40 mg Oral QPM    carvedilol  25 mg Oral BID    insulin aspart  1-7 Units Subcutaneous TID AC    insulin aspart  1-5 Units Subcutaneous At Bedtime    insulin glargine  20 Units Subcutaneous At Bedtime    mirtazapine  15 mg Oral At Bedtime    prazosin  5 mg Oral At Bedtime    prednisoLONE acetate  1 drop Both Eyes 4x Daily    tamsulosin  0.4 mg Oral Daily       Data reviewed today: I reviewed all new labs and imaging results over the last 24 hours.     Data     Recent Labs   Lab 10/30/23  1650 10/30/23  1401 10/30/23  1100 10/29/23  1103 10/29/23  1048   WBC  --   --   --   --  10.9   HGB  --   --   --   --  17.3   MCV  --   --   --   --  92   PLT  --   --   --   --  297   NA  --  140  --   --  140   POTASSIUM  --  4.3  --   --  3.5   CHLORIDE  --  104  --   --  100   CO2  --  26  --   --  17*   BUN  --  18.1  --   --  21.7   CR  --  1.31*  --   --  1.50*   ANIONGAP  --  10  --   --  23*   RUPA  --  9.4  --   --  9.6   GLC 78 129*  113*   < > 90   ALBUMIN  --   --   --   --  4.6   PROTTOTAL  --   --   --   --  7.6   BILITOTAL  --   --   --   --  0.3   ALKPHOS  --   --   --   --  123   ALT  --   --   --   --  23   AST  --   --   --   --  16    < > = values in this interval not displayed.       I personally reviewed   Head CT    No results found for this or any previous visit (from the past 24 hour(s)).    Medical Decision Making       Please see A&P for additional details of medical decision making.  MANAGEMENT DISCUSSED with the following over the past 24 hours:     NOTE(S)/MEDICAL RECORDS REVIEWED over the past 24 hours:    50 MINUTES SPENT BY ME on the date of service doing chart review, history, exam, documentation & further activities per the note.

## 2023-10-30 NOTE — CONSULTS
[facility]    Neurology Consultation     Date of Admission:  10/29/2023    Assessment & Plan   Nathan Bailey Jr. is a 62 year old male who was admitted on 10/29/2023. I was asked to see the patient for seizure, hypoglycemia.  The patient is a 62-year-old gentleman who presented with prolonged episode of hypoglycemia and associated seizures very likely due to low glucose.  The patient has had an EEG which was a normal study with no epileptiform discharges.  At this time I would recommend the following    -Complete the work-up with an MRI of the head  -No antiepileptic drug is recommended at this time  -The patient is not supposed to drive for 3 months I discussed with him about loss of consciousness and the law of driving and DMV requests.  -The patient needs a very likely continuous monitor for glucose, he does not feel the episodes of hypoglycemia.  Close follow-up for his diabetes is of paramount importance.    -Follow-up with neurology, [facility]    Neurology will sign off if there are any questions or concerns please feel free to contact us    Magalis Link MD    Code Status    No Order    Reason for Consult   Reason for consult: I was asked by Dr Yañez to evaluate this patient for seizure episode of hypoglycemia.    Primary Care Physician   [facility]    Chief Complaint   Seizure, hypoglycemia    History is obtained from the patient and patient's significant other    History of Present Illness   Nathan Bailey Jr. is a 62 year old male with diabetes for the last 3 or 4 years on insulin.  Patient's wife states that she heard noise and went to see the patient, she found him on the floor awake but disoriented.  She tried to help him up but she was not able to and called 911.  When paramedics arrived the patient was sitting on the floor confused awake.  Blood pressure was 132/67 with a pulse of 59 and blood sugar read  "\"low\" which is less than 20.  The patient was given IV D50 and while the paramedics were there the patient has had 1 minute of generalized tonicoclonic seizure.  Oxygenation dropped to 70% but the patient recuperated fast after he started breathing.  He was slightly confused after.  Blood sugars by paramedics increased to 86 however in the ambulance to the hospital blood sugar read again 50.  In emergency room it was 52.  Later on during emergency room visit again blood sugar went down to 38    The patient has never had a seizure.  He states he does not feel when the blood sugar is low.  He took his insulin in the morning however he did not eat yet anything.  Patient's wife states that the patient is eating a lot of sugary foods.    The patient states he has numbness and tingling in both feet.    He denies alcohol abuse, the patient states that presently he is not drinking too much.  He is a smoker.  He denies drug use.      Past Medical History   Type 2 Diabetes   Anxiety  Chronic back pain  Chronic PTSD  Depression  Hypertension  Cocaine dependence  Alcohol abuse  JAMIE  Tobacco dependence  Hyperglycemia    Past Surgical History   I have reviewed this patient's surgical history and updated it with pertinent information if needed.  No past surgical history on file.    Prior to Admission Medications   Prior to Admission Medications   Prescriptions Last Dose Informant Patient Reported? Taking?   Insulin Glargine-yfgn 100 UNIT/ML SOPN 10/29/2023  Yes No   Sig: Inject 48 Units Subcutaneous at bedtime   amLODIPine (NORVASC) 10 MG tablet   Yes Yes   Sig: Take 1 tablet by mouth daily   atorvastatin (LIPITOR) 80 MG tablet   Yes Yes   Sig: Take 40 mg by mouth daily   carvedilol (COREG) 25 MG tablet   Yes Yes   Sig: Take 1 tablet by mouth 2 times daily   gabapentin (NEURONTIN) 300 MG capsule   Yes Yes   Sig: Take 300 mg by mouth 2 times daily as needed for neuropathic pain   ketorolac (ACULAR) 0.5 % ophthalmic solution   Yes " Yes   Sig: Place 1 drop into both eyes 4 times daily   melatonin 3 MG tablet   Yes Yes   Sig: Take 2 tablets by mouth nightly as needed for sleep   mirtazapine (REMERON) 15 MG tablet   Yes Yes   Sig: Take 15 mg by mouth at bedtime   prazosin (MINIPRESS) 5 MG capsule   Yes Yes   Sig: Take 5 mg by mouth at bedtime   prednisoLONE acetate (PRED FORTE) 1 % ophthalmic suspension   Yes Yes   Sig: Place 1 drop into both eyes 4 times daily   spironolactone (ALDACTONE) 25 MG tablet   Yes Yes   Sig: Take 25 mg by mouth daily   tamsulosin (FLOMAX) 0.4 MG capsule   Yes Yes   Sig: Take 0.4 mg by mouth daily   varenicline (CHANTIX) 1 MG tablet   Yes No   Sig: Take 1 mg by mouth 2 times daily      Facility-Administered Medications: None     Allergies   No Known Allergies    Social History   The patient is retired.  In the past he was drinking he denies drinking presently.  He is a smoker.  He used to be in the [employer] and also worked as a .    Family History   I have reviewed this patient's family history and updated it with pertinent information if needed.   No family history on file.    Review of Systems   The 10 point Review of Systems is negative other than noted in the HPI or here.     Physical Exam   Temp: 97.8  F (36.6  C) Temp src: Oral BP: (!) 140/81 Pulse: 65   Resp: 18 SpO2: 96 % O2 Device: None (Room air)    Vital Signs with Ranges  Temp:  [97.8  F (36.6  C)-98.3  F (36.8  C)] 97.8  F (36.6  C)  Pulse:  [62-77] 65  Resp:  [18-20] 18  BP: (102-143)/(50-97) 140/81  SpO2:  [96 %-99 %] 96 %  213 lbs 0 oz    Constitutional: Normal  Eyes: No conjunctival erythema  ENT: Neck is supple  Respiratory: CTA  Cardiovascular: RRR  Skin: No obvious lesions  Musculoskeletal: No pedal edema  The patient is alert oriented x3 no acute distress.  Speech is fluent there is no aphasia apraxia or agnosia.  Attention and memory are normal.  Pupils are equal round reactive to light extraocular movements are intact, there is no  nystagmus.  Facial muscles are equal bilaterally.  Uvula and tongue are midline.  Muscular mass tone and strength are normal in all 4 extremities there is no pronator drift.  Reflexes are present symmetric in upper and lower extremities with 0 ankle jerks.  Babinski is absent.    Sensory exam is normal to light touch.  Vibratory sense was decreased at the ankles bilaterally.  Finger-nose-finger without dysmetria.  Fine movements are normal.    Gait has normal base.  The patient is able to walk in tandem.  Neuropsychiatric: Appropriate    Data   Results for orders placed or performed during the hospital encounter of 10/29/23 (from the past 24 hour(s))   Glucose by meter   Result Value Ref Range    GLUCOSE BY METER POCT 143 (H) 70 - 99 mg/dL   Glucose by meter   Result Value Ref Range    GLUCOSE BY METER POCT 92 70 - 99 mg/dL   Glucose by meter   Result Value Ref Range    GLUCOSE BY METER POCT 128 (H) 70 - 99 mg/dL   Glucose by meter   Result Value Ref Range    GLUCOSE BY METER POCT 113 (H) 70 - 99 mg/dL   EEG Awake or Drowsy Routine    Narrative    EEG Awake or Drowsy Routine Result    EEG DATE: 10/30/23  EEG LOG: [facility]  portable  EEG DAY#: 0  EEG SOURCE FILE DURATION: 20 min    PATIENT INFORMATION: Nathan Bailey Jr. is a 62 year old year old male   who presents with hypoglycemia, seizure. EEG is being done to evaluate for   repetitive seizures.     MEDICATIONS: see chart  These medications and doses were derived from the medical record at the   time of this procedure.    TECHNICAL SUMMARY: This routine EEG was recorded from 10-20 scalp   electrodes placed according to the 10-20 international system. Additional   electrodes were used for referencing, EKG, and to record from other   cerebral regions as appropriate.    BACKGROUND ACTIVITY:  During wakefulness, the background activity consists   of synchronous and symmetric, well-modulated, 8 hz posterior dominant   rhythm. The posterior dominant rhythm  attenuated with eye opening. During   drowsiness, the background activity waxed and waned and there were periods   of slowing and attenuation of the posterior alpha rhythm.  There are no   seizures or epileptiform discharges recorded.    ACTIVATION PROCEDURE: Photic stimulation was done and this produced no   changes in the record..    EKG was recorded on this shows a regular rhythm with a heart rate of   72/min.    IMPRESSION: This is a normal awake electroencephalogram. No electrographic   seizures or epileptiform discharges were recorded. Clinical correlation is   advised.       Magalis Link MD  [facility]    Basic metabolic panel   Result Value Ref Range    Sodium 140 135 - 145 mmol/L    Potassium 4.3 3.4 - 5.3 mmol/L    Chloride 104 98 - 107 mmol/L    Carbon Dioxide (CO2) 26 22 - 29 mmol/L    Anion Gap 10 7 - 15 mmol/L    Urea Nitrogen 18.1 8.0 - 23.0 mg/dL    Creatinine 1.31 (H) 0.67 - 1.17 mg/dL    GFR Estimate 62 >60 mL/min/1.73m2    Calcium 9.4 8.8 - 10.2 mg/dL    Glucose 129 (H) 70 - 99 mg/dL   Glucose by meter   Result Value Ref Range    GLUCOSE BY METER POCT 78 70 - 99 mg/dL

## 2023-10-30 NOTE — DISCHARGE SUMMARY
[facility]  Hospitalist Discharge Summary     Name: Nathan Bailey Jr.  Admit Date:  10/29/2023  Discharge Date:     10/31/2023  Discharging Provider: Evgeny Carrizales PA-C    PRIMARY CARE PROVIDER: [facility]     DISCHARGE DIAGNOSES:   Principal Problem:    Hypoglycemia  Active Problems:    Seizure (H)    Type 2 diabetes mellitus with hypoglycemia without coma, with long-term current use of insulin (H)    Obstructive sleep apnea syndrome    Hypertension    Cocaine substance abuse (H)    Chronic post-traumatic stress disorder (PTSD)    BRIEF HISTORY OF PRESENT ILLNESS:    This  is a 62 year old male, w a PMH ofM2, HTN, HLD, BPH, PTSA, Anxiety, chronic low back pain, s.p surgery, cocaine abuse (sober 2022), who was admitted on 10/29/2023 after a seizure in setting of severe hypoglycemia , thought related to far high of PTA lantus dose (50u HS), w ? Contribution from cocaine use/relapse.      PENDING RESULTS:    Unresulted Labs Ordered in the Past 30 Days of this Admission       Date and Time Order Name Status Description    10/30/2023  8:43 AM Urine Drug Confirmation Panel In process         These results [facility] .   To see if other metabolites present outside of cocaine       Consultations This Hospital Stay   CHEMICAL DEPENDENCY IP CONSULT  NEUROSURGERY IP CONSULT  NEUROLOGY IP CONSULT  NEUROLOGY IP CONSULT     HOSPITAL COURSE:     Hypoglycemia, resolved,   Type 2 diabetes mellitus with hypoglycemia without coma, with long-term current use of insulin (H)    Patient with seizure event, possibly related to hypoglycemia (BG<20 per EMS, given D50 in the field). No previous sz hx. Possible that use of cocaine 1d before also contrib to hypoglycemia + sz, with suspicion that high lose of lantus (PTA 50u HS x 3mo) was the major cause  of the low too. Note insulin to high w Last HbA1c 5.5 on 9/21/2023.  Pt reported history of vertigo had another  unusual spell around a month ago reported though that didn't have such classic seizure description.  Head CT w old infarcts (not prev mentioned, pt denied known hx of CVA or sx),  s/p EEG w/o any sz activity.  Neurology consult ordered (late) 10/30.  Did get MRI B which did not show any acute nor old CVA, nor other pathologyHome lantus resumed at 10u (PAT 50u), and  glu > 400 in ED after PTA d50m and ate + given IV glucose.  IV glucose stopped.  PTA Lantus was decreased to only 10 units at bedtime.  He will slowly increase based on his home point-of-care glucometer values.  He should need anything more than 32 at most 40 units.  But he will go very slow.  He was given a new glucometer and supplies.  Close follow-up with primary care.  He will make sure he seen within 1 to 2 weeks.. We  have advised the patient he should not drive at discharge immediately given the recent seizure, he will need outpatient follow-up with PCP to consider when safe to resume driving.      Seizure (H), resolved  Fall  Sz at home, witnessed by wife, contrib likely mainly due to severe hypoglycemia and cocaine use 1d before . None here, EEG read as WNL (by Dr Link, who will see pt tomorrow in consult. CTH w old infarcts suspected, age indeterminate lacunar infarcts in the right caudate and superior cerebellum. head CT and C-spine CT w/o acute injury. Will get MRI Brain, neuro consulted tonight, to see in am.  EEG had no epileptiform activity.  Was seen by neurology, no new orders.  No AEDs indicated.  Patient still advised to avoid future cocaine.  Patient understands to return immediately should he have any future seizure activity especially without any triggers    Outpatient PCP can consider if statin should be increased to high intensity and possibly of ASA 81 should be considered    Acute Kidney Injury on CKD  * Suspect 2/2 poor oral intake, plus PTA aldactone  Baseline Cr appears ~1.3. On admission, 1.50, thought to be HERNÁN.   Given IVF, creatinine improved.  And was at 1.30 on 10/31.  Patient or stands to avoid outpatient nephrotoxins including NSAIDs.  Consider repeat BMP in a few weeks.  Could consider an ACE inhibitor, primary care to further evaluate.     Elevated Troponin  * 39 on admit, -->37 suspect mild elevation related to demand.  * EKG with sinus rhythm with 1st degree AV block, rate 85, , Qtc 445, no obvious ST elevation.-  Patient asymptomatic, denies cardiac symptoms - on telemetry 24h.  Patient was asymptomatic here.- outpt PCP to consider stress test/risk stratification     CHF , Hypertension   Overall BP well controlled, no HF sx, No CP  - PTA Amlodipine and PTA coreg resumed  -Outpatient consider ACE inhibitor as above  -delayed resume PTA Spironolactone on d.c (w stable Cr)     HLD - PTA atorvastatin, PCP can reeval dose    BPH - continue PTA Flomax     Erectile dysfunction- mentions as a chronic ED sx at time of d.c, risks incl DM2. Recommend PCP followup to consider prn meds     Tobacco use disorder -  pt quit smoking 3mo ago (yay), encouraged to continue using nicotine Dimitri, replacements often . Not on chantix.      Diabetic Neuropathy -  sx controlled at baseline, continue PTA Gabapentin      Cociane relapse/stimulant/substance use disorder.  Pt report hx of heavy cocaine use until 2022 when he was able to get sober (living in [place] at the time). Moved to [place] and after 1.5 years of sobriety, relapsed ~10/27/23 (snorted coke) at  reunion w old GI friends, , is remorseful re relapse. Could be assoc w sz/low BG too - relapse avoidance/cessation encouraged, he seem motivated - s/p CD consult - pt declined tx- pt will increase PTA NA meetings frequency.  He told a sponsor.  We had extensive discussion on this and he sounds motivated to remain clean.  Follow-up with primary care     Anxiety, PTSD  - follows w [facility] psychiatry, continues video visits, continues PTA prazosin. mirtazapine-Psychiatry or PCP to  "consider Cymbalta trial for mood outpt given chronic low back pain     Chronic post- low back pain, s/p prev surgery  -Pain controlled here, prn APAP, lidoderm, PCP f/u     Hx Glaucoma- PTA eyedrops resumed     Elevated Hgb, 17.3, in a smoker (quit 2 months prior)  - recheck CBC shows improved 16.4  . PCP can follow      Clinically Significant Risk Factors Present on Admission                  # Hypertension: Noted on problem list      # Overweight: Estimated body mass index is 29.71 kg/m  as calculated from the following:    Height as of this encounter: 1.803 m (5' 11\").    Weight as of this encounter: 96.6 kg (213 lb).                DISCHARGE MEDICATIONS:       Review of your medicines        START taking        Dose / Directions   * blood glucose monitoring meter device kit  Commonly known as: NO BRAND SPECIFIED      Use to test blood sugar 3 times daily or as directed.  Quantity: 1 kit  Refills: 0     * blood glucose monitoring meter device kit  Commonly known as: NO BRAND SPECIFIED      Use to test blood sugar 3 times daily or as directed. Preferred blood glucose meter OR supplies to accompany: Blood Glucose Monitor Brands: per insurance.  Quantity: 1 kit  Refills: 0     blood glucose test strip  Commonly known as: NO BRAND SPECIFIED      Use to test blood sugar 3 times daily or as directed. To accompany: Blood Glucose Monitor Brands: per insurance.  Quantity: 100 strip  Refills: 6     thin lancets  Commonly known as: NO BRAND SPECIFIED      Use with lanceting device. To accompany: Blood Glucose Monitor Brands: per insurance.  Quantity: 100 each  Refills: 6           * This list has 2 medication(s) that are the same as other medications prescribed for you. Read the directions carefully, and ask your doctor or other care provider to review them with you.                CONTINUE these medicines which may have CHANGED, or have new prescriptions. If we are uncertain of the size of tablets/capsules you have at " home, strength may be listed as something that might have changed.        Dose / Directions   Insulin Glargine-yfgn 100 UNIT/ML Sopn  This may have changed: how much to take      Dose: 22 Units  Inject 22 Units Subcutaneous at bedtime  Refills: 0            CONTINUE these medicines which have NOT CHANGED        Dose / Directions   amLODIPine 10 MG tablet  Commonly known as: NORVASC      Dose: 1 tablet  Take 1 tablet by mouth daily  Refills: 0     atorvastatin 80 MG tablet  Commonly known as: LIPITOR      Dose: 40 mg  Take 40 mg by mouth daily  Refills: 0     carvedilol 25 MG tablet  Commonly known as: COREG      Dose: 1 tablet  Take 1 tablet by mouth 2 times daily  Refills: 0     gabapentin 300 MG capsule  Commonly known as: NEURONTIN      Dose: 300 mg  Take 300 mg by mouth 2 times daily as needed for neuropathic pain  Refills: 0     ketorolac 0.5 % ophthalmic solution  Commonly known as: ACULAR      Dose: 1 drop  Place 1 drop into both eyes 4 times daily  Refills: 0     melatonin 3 MG tablet      Dose: 2 tablet  Take 2 tablets by mouth nightly as needed for sleep  Refills: 0     mirtazapine 15 MG tablet  Commonly known as: REMERON      Dose: 15 mg  Take 15 mg by mouth at bedtime  Refills: 0     prazosin 5 MG capsule  Commonly known as: MINIPRESS      Dose: 5 mg  Take 5 mg by mouth at bedtime  Refills: 0     prednisoLONE acetate 1 % ophthalmic suspension  Commonly known as: PRED FORTE      Dose: 1 drop  Place 1 drop into both eyes 4 times daily  Refills: 0     spironolactone 25 MG tablet  Commonly known as: ALDACTONE      Dose: 25 mg  Take 25 mg by mouth daily  Refills: 0     tamsulosin 0.4 MG capsule  Commonly known as: FLOMAX      Dose: 0.4 mg  Take 0.4 mg by mouth daily  Refills: 0            STOP taking      varenicline 1 MG tablet  Commonly known as: CHANTIX                  Where to get your medicines        These medications were sent to [facility] [place]  [place], [place] - [address] [place] [phone] [facility], [place] [id number]      Phone: [phone]   blood glucose monitoring meter device kit  blood glucose monitoring meter device kit  blood glucose test strip  thin lancets        ALLERGIES:    No Known Allergies       Follow-up and recommended labs and tests     Follow up with primary care provider, [facility], within 2 weeks, to evaluate medication change and for hospital follow- up. The following labs/tests are recommended: ? Continuopus glucose monitor, eval med changes as per note.     Activity    Your activity upon discharge: activity as tolerated     Reason for your hospital stay    Seizure likely caused by low blood sugar.  +/- from cocaine use too  Decreased lantus to 22units at bedtine  Over time, may need to slowly increase to max 40 unit IF the glucose remains > 160 consistently   Please see primary care for a recheck within 2 weeks.    Resume a carb controlled diet diet (limited carbs/ sweets). AND low salt (<2000mg a day)  Check blood sugars 3 times a day with home testing supplies.   Write down or log the blood glucose readings for primary care to review.    If your blood glucose is over 250 consistently or under 70 (at that point do not take insulin/antidiabetic medications until further clarification), please call primary care for further instructions and dose changes.      See [facility] primary care in 2 weeks for recheck  ? Long acting glucose monitor- talk w primary care     Same mood meds- Remeron, and prazosin.   Could Consdier cymbalta (for back pain + mood) - talk with primary care    Avoid situations where relapse is possible, rtn to NA- consider increasing goal to 30 meeting in 30 days     Full Code     Diet    Follow this diet upon discharge: Orders Placed This Encounter      Moderate Consistent Carb (60 g CHO per Meal) Diet     DISPOSITION:      Discharged to home  Condition at discharge: Stable  As of 5/11/2023 and the end of the public health  "emergency, this waiver is no longer valid.       LABORATORY IMAGING AND PROCEDURES:   Recent Labs   Lab Test 10/31/23  1146 10/31/23  1118 10/31/23  0708 10/30/23  1650 10/30/23  1401 10/29/23  1103 10/29/23  1048   WBC 8.0  --   --   --   --   --  10.9   HGB 16.4  --   --   --   --   --  17.3   MCV 86  --   --   --   --   --  92     --   --   --   --   --  297   NA  --   --  142  --  140  --  140   POTASSIUM  --   --  4.0  --  4.3  --  3.5   CHLORIDE  --   --  105  --  104  --  100   CO2  --   --  27  --  26  --  17*   ANIONGAP  --   --  10  --  10  --  23*   GLC  --  121* 86   < > 129*   < > 90   BUN  --   --  17.8  --  18.1  --  21.7   CR  --   --  1.30*  --  1.31*  --  1.50*   GFRESTIMATED  --   --  62  --  62  --  52*   RUPA  --   --  9.4  --  9.4  --  9.6   MAG  --   --   --   --   --   --  2.6*   AST  --   --   --   --   --   --  16   ALT  --   --   --   --   --   --  23   ALKPHOS  --   --   --   --   --   --  123   PROTTOTAL  --   --   --   --   --   --  7.6   ALBUMIN  --   --   --   --   --   --  4.6   BILITOTAL  --   --   --   --   --   --  0.3    < > = values in this interval not displayed.          PHYSICAL EXAMINATION ON DAY OF DISCHARGE   /81 (BP Location: Right arm)   Pulse 80   Temp 98  F (36.7  C) (Oral)   Resp 16   Ht 1.803 m (5' 11\")   Wt 96.6 kg (213 lb)   SpO2 96%   BMI 29.71 kg/m    Vitals:    10/30/23 2227 10/30/23 2352 10/31/23 0400 10/31/23 0900   BP: 122/79 127/82 121/80 124/81   BP Location:    Right arm   Patient Position:       Cuff Size:       Pulse: 73 68 70 80   Resp:  16 16 16   Temp:  98.1  F (36.7  C) 98  F (36.7  C) 98  F (36.7  C)   TempSrc:  Oral Oral Oral   SpO2:  95% 96% 96%   Weight:       Height:          Wt Readings from Last 2 Encounters:   10/29/23 96.6 kg (213 lb)       Constitutional: Awake, alert, cooperative, no apparent distress.    ENT: Normocephalic,  Neck: no JVD<, no adenopathy.  Pulmonary: on RA,  clear to auscultation bilaterally, no crackles " or wheezing.  Cardiovascular: Regular rate and rhythm, normal S1 and S2,  no murmur noted.  GI: ObeseNormal bowel sounds, soft, non-distended, non-tender.  .  Skin/Integumen: Visualized skin appeared clear.  Neuro: no droop noted, CN II-XII grossly intact.  Patient seen moving all 4 extremities without difficulty.    Psych:  Alert and oriented x 3. Normal affect.  Extremities: No lower extremity edema noted     The patient was discussed with Dr. Khan who agrees with the above plan and discharge at this time.       TOTAL DISCHARGE TIME:  IEvgeny PA-C, personally saw the patient today and spent less than or equal to 30 minutes discharging this patient.    It was a pleasure taking care of Nathan Bailey Jr..  If you have any questions, please contact me for clarification. Please note: this documentation was completed using iLike dictation software. It was briefly reviewed and proofread, but may nonetheless have unintended substitutions , incorrect words or phrases. If there are any questions regarding content, please contact me for clarification.    Evgeny Carrizales PA-C    [facility]  Securely message with the Vocera Web Console (learn more here)  Text page via [employer] Paging/Directory

## 2023-10-30 NOTE — CONSULTS
Met with patient to discuss possible treatment options and to possibly complete an assessment. Patient states he is currently involved in treatment at the [facility].  Patient states he isn't interested in any other ANDREA treatment or ANDREA services at this time.    JEFFRY Woodward on 10/30/2023 at 11:03 AM

## 2023-10-31 VITALS
WEIGHT: 213 LBS | SYSTOLIC BLOOD PRESSURE: 124 MMHG | TEMPERATURE: 98 F | HEART RATE: 80 BPM | RESPIRATION RATE: 16 BRPM | HEIGHT: 71 IN | OXYGEN SATURATION: 96 % | DIASTOLIC BLOOD PRESSURE: 81 MMHG | BODY MASS INDEX: 29.82 KG/M2

## 2023-10-31 LAB
ANION GAP SERPL CALCULATED.3IONS-SCNC: 10 MMOL/L (ref 7–15)
BUN SERPL-MCNC: 17.8 MG/DL (ref 8–23)
CALCIUM SERPL-MCNC: 9.4 MG/DL (ref 8.8–10.2)
CHLORIDE SERPL-SCNC: 105 MMOL/L (ref 98–107)
CREAT SERPL-MCNC: 1.3 MG/DL (ref 0.67–1.17)
DEPRECATED HCO3 PLAS-SCNC: 27 MMOL/L (ref 22–29)
EGFRCR SERPLBLD CKD-EPI 2021: 62 ML/MIN/1.73M2
ERYTHROCYTE [DISTWIDTH] IN BLOOD BY AUTOMATED COUNT: 13.2 % (ref 10–15)
GLUCOSE BLDC GLUCOMTR-MCNC: 121 MG/DL (ref 70–99)
GLUCOSE BLDC GLUCOMTR-MCNC: 87 MG/DL (ref 70–99)
GLUCOSE SERPL-MCNC: 86 MG/DL (ref 70–99)
HCT VFR BLD AUTO: 50.5 % (ref 40–53)
HGB BLD-MCNC: 16.4 G/DL (ref 13.3–17.7)
MCH RBC QN AUTO: 27.8 PG (ref 26.5–33)
MCHC RBC AUTO-ENTMCNC: 32.5 G/DL (ref 31.5–36.5)
MCV RBC AUTO: 86 FL (ref 78–100)
PLATELET # BLD AUTO: 243 10E3/UL (ref 150–450)
POTASSIUM SERPL-SCNC: 4 MMOL/L (ref 3.4–5.3)
RBC # BLD AUTO: 5.89 10E6/UL (ref 4.4–5.9)
SODIUM SERPL-SCNC: 142 MMOL/L (ref 135–145)
WBC # BLD AUTO: 8 10E3/UL (ref 4–11)

## 2023-10-31 PROCEDURE — 82962 GLUCOSE BLOOD TEST: CPT

## 2023-10-31 PROCEDURE — G0378 HOSPITAL OBSERVATION PER HR: HCPCS

## 2023-10-31 PROCEDURE — 99285 EMERGENCY DEPT VISIT HI MDM: CPT | Mod: 25

## 2023-10-31 PROCEDURE — 96365 THER/PROPH/DIAG IV INF INIT: CPT

## 2023-10-31 PROCEDURE — 36415 COLL VENOUS BLD VENIPUNCTURE: CPT | Performed by: PHYSICIAN ASSISTANT

## 2023-10-31 PROCEDURE — 80048 BASIC METABOLIC PNL TOTAL CA: CPT | Performed by: PHYSICIAN ASSISTANT

## 2023-10-31 PROCEDURE — 93005 ELECTROCARDIOGRAM TRACING: CPT

## 2023-10-31 PROCEDURE — 85027 COMPLETE CBC AUTOMATED: CPT | Performed by: PHYSICIAN ASSISTANT

## 2023-10-31 PROCEDURE — 96366 THER/PROPH/DIAG IV INF ADDON: CPT

## 2023-10-31 PROCEDURE — 250N000013 HC RX MED GY IP 250 OP 250 PS 637: Performed by: PHYSICIAN ASSISTANT

## 2023-10-31 RX ORDER — INSULIN GLARGINE-YFGN 100 [IU]/ML
22 INJECTION, SOLUTION SUBCUTANEOUS AT BEDTIME
Start: 2023-10-31

## 2023-10-31 RX ADMIN — CARVEDILOL 25 MG: 25 TABLET, FILM COATED ORAL at 09:04

## 2023-10-31 RX ADMIN — AMLODIPINE BESYLATE 10 MG: 10 TABLET ORAL at 09:04

## 2023-10-31 RX ADMIN — PREDNISOLONE ACETATE 1 DROP: 10 SUSPENSION/ DROPS OPHTHALMIC at 09:09

## 2023-10-31 RX ADMIN — TAMSULOSIN HYDROCHLORIDE 0.4 MG: 0.4 CAPSULE ORAL at 09:04

## 2023-10-31 ASSESSMENT — ACTIVITIES OF DAILY LIVING (ADL)
ADLS_ACUITY_SCORE: 35

## 2023-10-31 NOTE — PLAN OF CARE
Goal Outcome Evaluation:  PRIMARY Concern: Fall, Hypoglycemia, Seizure  SAFETY RISK Concerns (fall risk, behaviors, etc.): Behavior- green      Isolation/Type: None  Tests/Procedures for NEXT shift: EEG normal, MRI @ 8:25 (gave lorazepam for anxiety)  Consults? (Pending/following, signed-off?) Neurology following, Chemical dependency (complete pt will continue current treatment)  Where is patient from? (Home, TCU, etc.): Home  Anticipated DC date & active delays: Pending on MRI   _____________________________________________________________________________  SUMMARY NOTE:     Orientation/Cognitive: A&Ox4; forgetful  Observation Goals (Met/ Not Met): Not Met  Mobility Level/Assist Equipment: IND  Antibiotics & Plan (IV/po, length of tx left): None  Tele/VS/O2: VSS on RA  ABNL Lab/BG: creatinine 1.31  Diet: Mod carb diet  Bowel/Bladder: Cont. B&B  Skin Concerns: n/a  Drains/Devices: PIV SL

## 2023-10-31 NOTE — PLAN OF CARE
"Pt is discharging home at this time w/ all pt's belongings. AVS and education given. Pt is in hurry to discharge, and did not want to wait for the glucometer. Writer walked with the pt to discharge Pharmacy where the pharmacist told the patient that the insurance doesn't cover the Glucometer. [facility]. Pt is understands and said\" I kind of figured it out\". Pt's Spouse to transport.       Plan of Care Reviewed With: patient                 "

## 2023-10-31 NOTE — PLAN OF CARE
Observation goals  PRIOR TO DISCHARGE        -diagnostic tests and consults completed and resulted- Partially met  -vital signs normal or at patient baseline- Partially met  -tolerating oral intake to maintain hydration- Met  -adequate pain control on oral analgesics- Partially met  -stable blood glucose values- Partially met   Nurse to notify provider when observation goals have been met and patient is ready for discharge.  Yes

## 2023-10-31 NOTE — PROGRESS NOTES
Observation goals  PRIOR TO DISCHARGE        -diagnostic tests and consults completed and resulted- met  -vital signs normal or at patient baseline- met  -tolerating oral intake to maintain hydration- Met  -adequate pain control on oral analgesics- met  -stable blood glucose values- Partially met   Nurse to notify provider when observation goals have been met and patient is ready for discharge.  Yes

## 2023-10-31 NOTE — PLAN OF CARE
Goal Outcome Evaluation:    PRIMARY Concern: Fall, Hypoglycemia, Seizure (tonic/clonic)  SAFETY RISK Concerns (fall risk, behaviors, etc. None    Isolation/Type: None  Tests/Procedures for NEXT shift: Consults? (Pending/following, signed-off?) Neurology following,   Where is patient from? (Home, TCU, etc.): Home  Other Important info for NEXT shift:   Anticipated DC date & active delays: TBD  _____________________________________________________________________________  SUMMARY NOTE:     Orientation/Cognitive: A&Ox4; forgetful  Observation Goals (Met/ Not Met): Not Met  Mobility Level/Assist Equipment: IND  Antibiotics & Plan (IV/po, length of tx left): None  Tele/VS/O2: VSS on RA  ABNL Lab/B BG 84 orange juice given  Diet: Mod carb diet  Bowel/Bladder: Cont. B&B, voiding  Skin Concerns: n/a  Drains/Devices: PIV SL     Observation goals  PRIOR TO DISCHARGE       -diagnostic tests and consults completed and resulted- met  -vital signs normal or at patient baseline- met  -tolerating oral intake to maintain hydration- Met  -adequate pain control on oral analgesics- met  -stable blood glucose values- Partially met   Nurse to notify provider when observation goals have been met and patient is ready for discharge.

## 2023-10-31 NOTE — PROGRESS NOTES
Observation goals  PRIOR TO DISCHARGE         -diagnostic tests and consults completed and resulted- met    -vital signs normal or at patient baseline- met    -tolerating oral intake to maintain hydration- Met    -adequate pain control on oral analgesics- met    -stable blood glucose values- met     Nurse to notify provider when observation goals have been met and patient is ready for discharge.

## 2023-10-31 NOTE — PLAN OF CARE
"Goal Outcome Evaluation:  PRIMARY Concern: Fall, Hypoglycemia  SAFETY RISK Concerns (fall risk, behaviors, etc.): No Fall risks/Independent    Isolation/Type: No  Tests/Procedures for NEXT shift: consult  Consults? (Pending/following, signed-off?) Neurology following  Where is patient from? (Home, TCU, etc.): Home  Other Important info for NEXT shift:   Anticipated DC date & active delays: to be determined 10/31/23?  _____________________________________________________________________________  SUMMARY NOTE:     Orientation/Cognitive: A&Ox4; forgetful  Observation Goals (Met/ Not Met): Partially Met  Mobility Level/Assist Equipment: Independent  Antibiotics & Plan (IV/po, length of tx left): Not applicable  Pain Management: headache, declined pain medications when offered  Tele/VS/O2: VSS on RA  ABNL Lab/BG: BG  Diet: Mod carb diet  Bowel/Bladder: voiding  Skin Concerns: No concerns  Drains/Devices: IV site saline locked  Patient Stated Goal for Today: \"have MRI\" and sleep\"      "

## 2023-11-02 ENCOUNTER — PATIENT OUTREACH (OUTPATIENT)
Dept: CARE COORDINATION | Facility: CLINIC | Age: 63
End: 2023-11-02

## 2023-11-02 NOTE — PROGRESS NOTES
[facility] Care Resource Center Contact  [facility]/Voicemail     Clinical Data: Post-Discharge Outreach     Outreach attempted x 2.  Left message on patient's voicemail, providing [facility]'s central phone number of [phone] ([phone]) for questions/concerns and/or to schedule an appt with an [facility] provider, if they do not have a PCP.      Plan:  [facility] will do no further outreaches at this time.       Constance Rai  Community Health Worker  [facility], [facility]  [phone]      *Connected Care Resource Team does NOT follow patient ongoing. Referrals are identified based on internal discharge reports and the outreach is to ensure patient has an understanding of their discharge instructions.

## 2023-11-03 LAB
BZE UR CFM-MCNC: >8000 NG/ML
BZE/CREAT UR: ABNORMAL
GABAPENTIN UR QL CFM: PRESENT

## 2024-12-28 ENCOUNTER — HOSPITAL ENCOUNTER (EMERGENCY)
Facility: CLINIC | Age: 64
Discharge: HOME | End: 2024-12-29
Attending: EMERGENCY MEDICINE
Payer: COMMERCIAL

## 2024-12-28 ENCOUNTER — APPOINTMENT (OUTPATIENT)
Dept: CT IMAGING | Facility: CLINIC | Age: 64
End: 2024-12-28
Attending: EMERGENCY MEDICINE
Payer: COMMERCIAL

## 2024-12-28 VITALS
WEIGHT: 172 LBS | BODY MASS INDEX: 24.08 KG/M2 | DIASTOLIC BLOOD PRESSURE: 101 MMHG | SYSTOLIC BLOOD PRESSURE: 138 MMHG | HEART RATE: 101 BPM | RESPIRATION RATE: 19 BRPM | TEMPERATURE: 97.1 F | HEIGHT: 71 IN | OXYGEN SATURATION: 95 %

## 2024-12-28 DIAGNOSIS — N17.9 AKI (ACUTE KIDNEY INJURY): ICD-10-CM

## 2024-12-28 DIAGNOSIS — R11.2 NAUSEA AND VOMITING, UNSPECIFIED VOMITING TYPE: ICD-10-CM

## 2024-12-28 DIAGNOSIS — E87.20 LACTIC ACIDOSIS: ICD-10-CM

## 2024-12-28 DIAGNOSIS — E86.0 DEHYDRATION: ICD-10-CM

## 2024-12-28 DIAGNOSIS — R79.89 ELEVATED TROPONIN: ICD-10-CM

## 2024-12-28 DIAGNOSIS — D72.829 LEUKOCYTOSIS, UNSPECIFIED TYPE: ICD-10-CM

## 2024-12-28 DIAGNOSIS — R55 SYNCOPE, UNSPECIFIED SYNCOPE TYPE: ICD-10-CM

## 2024-12-28 LAB
ALBUMIN SERPL BCG-MCNC: 4.8 G/DL (ref 3.5–5.2)
ALP SERPL-CCNC: 179 U/L (ref 40–150)
ALT SERPL W P-5'-P-CCNC: 31 U/L (ref 0–70)
ANION GAP SERPL CALCULATED.3IONS-SCNC: 15 MMOL/L (ref 7–15)
ANION GAP SERPL CALCULATED.3IONS-SCNC: 17 MMOL/L (ref 7–15)
AST SERPL W P-5'-P-CCNC: 19 U/L (ref 0–45)
ATRIAL RATE - MUSE: 125 BPM
BASOPHILS # BLD AUTO: 0.1 10E3/UL (ref 0–0.2)
BASOPHILS NFR BLD AUTO: 1 %
BILIRUB DIRECT SERPL-MCNC: <0.2 MG/DL (ref 0–0.3)
BILIRUB SERPL-MCNC: 0.6 MG/DL
BUN SERPL-MCNC: 23.1 MG/DL (ref 8–23)
BUN SERPL-MCNC: 24 MG/DL (ref 8–23)
CALCIUM SERPL-MCNC: 11.2 MG/DL (ref 8.8–10.4)
CALCIUM SERPL-MCNC: 9.6 MG/DL (ref 8.8–10.4)
CHLORIDE SERPL-SCNC: 84 MMOL/L (ref 98–107)
CHLORIDE SERPL-SCNC: 93 MMOL/L (ref 98–107)
CREAT SERPL-MCNC: 1.56 MG/DL (ref 0.67–1.17)
CREAT SERPL-MCNC: 1.75 MG/DL (ref 0.67–1.17)
DIASTOLIC BLOOD PRESSURE - MUSE: NORMAL MMHG
EGFRCR SERPLBLD CKD-EPI 2021: 43 ML/MIN/1.73M2
EGFRCR SERPLBLD CKD-EPI 2021: 49 ML/MIN/1.73M2
EOSINOPHIL # BLD AUTO: 0 10E3/UL (ref 0–0.7)
EOSINOPHIL NFR BLD AUTO: 0 %
ERYTHROCYTE [DISTWIDTH] IN BLOOD BY AUTOMATED COUNT: 15.9 % (ref 10–15)
GLUCOSE BLDC GLUCOMTR-MCNC: 189 MG/DL (ref 70–99)
GLUCOSE SERPL-MCNC: 144 MG/DL (ref 70–99)
GLUCOSE SERPL-MCNC: 179 MG/DL (ref 70–99)
HCO3 SERPL-SCNC: 34 MMOL/L (ref 22–29)
HCO3 SERPL-SCNC: 37 MMOL/L (ref 22–29)
HCT VFR BLD AUTO: 61.5 % (ref 40–53)
HGB BLD-MCNC: 20.6 G/DL (ref 13.3–17.7)
HOLD SPECIMEN: NORMAL
HOLD SPECIMEN: NORMAL
IMM GRANULOCYTES # BLD: 0.1 10E3/UL
IMM GRANULOCYTES NFR BLD: 1 %
INTERPRETATION ECG - MUSE: NORMAL
LACTATE SERPL-SCNC: 1.5 MMOL/L (ref 0.7–2)
LACTATE SERPL-SCNC: 3.4 MMOL/L (ref 0.7–2)
LIPASE SERPL-CCNC: 13 U/L (ref 13–60)
LYMPHOCYTES # BLD AUTO: 2.3 10E3/UL (ref 0.8–5.3)
LYMPHOCYTES NFR BLD AUTO: 13 %
MAGNESIUM SERPL-MCNC: 2.2 MG/DL (ref 1.7–2.3)
MCH RBC QN AUTO: 27.7 PG (ref 26.5–33)
MCHC RBC AUTO-ENTMCNC: 33.5 G/DL (ref 31.5–36.5)
MCV RBC AUTO: 83 FL (ref 78–100)
MONOCYTES # BLD AUTO: 1 10E3/UL (ref 0–1.3)
MONOCYTES NFR BLD AUTO: 5 %
NEUTROPHILS # BLD AUTO: 14 10E3/UL (ref 1.6–8.3)
NEUTROPHILS NFR BLD AUTO: 80 %
NRBC # BLD AUTO: 0 10E3/UL
NRBC BLD AUTO-RTO: 0 /100
P AXIS - MUSE: 62 DEGREES
PLATELET # BLD AUTO: 361 10E3/UL (ref 150–450)
POTASSIUM SERPL-SCNC: 3.6 MMOL/L (ref 3.4–5.3)
POTASSIUM SERPL-SCNC: 3.8 MMOL/L (ref 3.4–5.3)
PR INTERVAL - MUSE: 164 MS
PROT SERPL-MCNC: 8.5 G/DL (ref 6.4–8.3)
QRS DURATION - MUSE: 76 MS
QT - MUSE: 304 MS
QTC - MUSE: 438 MS
R AXIS - MUSE: -28 DEGREES
RBC # BLD AUTO: 7.43 10E6/UL (ref 4.4–5.9)
SODIUM SERPL-SCNC: 138 MMOL/L (ref 135–145)
SODIUM SERPL-SCNC: 142 MMOL/L (ref 135–145)
SYSTOLIC BLOOD PRESSURE - MUSE: NORMAL MMHG
T AXIS - MUSE: 85 DEGREES
TROPONIN T SERPL HS-MCNC: 44 NG/L
TROPONIN T SERPL HS-MCNC: 53 NG/L
VENTRICULAR RATE- MUSE: 125 BPM
WBC # BLD AUTO: 17.5 10E3/UL (ref 4–11)

## 2024-12-28 PROCEDURE — 80048 BASIC METABOLIC PNL TOTAL CA: CPT | Performed by: EMERGENCY MEDICINE

## 2024-12-28 PROCEDURE — 258N000003 HC RX IP 258 OP 636: Performed by: EMERGENCY MEDICINE

## 2024-12-28 PROCEDURE — 99285 EMERGENCY DEPT VISIT HI MDM: CPT | Mod: 25

## 2024-12-28 PROCEDURE — 96361 HYDRATE IV INFUSION ADD-ON: CPT

## 2024-12-28 PROCEDURE — 84075 ASSAY ALKALINE PHOSPHATASE: CPT | Performed by: EMERGENCY MEDICINE

## 2024-12-28 PROCEDURE — 93005 ELECTROCARDIOGRAM TRACING: CPT

## 2024-12-28 PROCEDURE — 83690 ASSAY OF LIPASE: CPT | Performed by: EMERGENCY MEDICINE

## 2024-12-28 PROCEDURE — 82248 BILIRUBIN DIRECT: CPT | Performed by: EMERGENCY MEDICINE

## 2024-12-28 PROCEDURE — 96375 TX/PRO/DX INJ NEW DRUG ADDON: CPT

## 2024-12-28 PROCEDURE — 85004 AUTOMATED DIFF WBC COUNT: CPT | Performed by: EMERGENCY MEDICINE

## 2024-12-28 PROCEDURE — 250N000011 HC RX IP 250 OP 636: Performed by: EMERGENCY MEDICINE

## 2024-12-28 PROCEDURE — 85018 HEMOGLOBIN: CPT | Performed by: EMERGENCY MEDICINE

## 2024-12-28 PROCEDURE — 96374 THER/PROPH/DIAG INJ IV PUSH: CPT

## 2024-12-28 PROCEDURE — 83605 ASSAY OF LACTIC ACID: CPT | Performed by: EMERGENCY MEDICINE

## 2024-12-28 PROCEDURE — 84484 ASSAY OF TROPONIN QUANT: CPT | Performed by: EMERGENCY MEDICINE

## 2024-12-28 PROCEDURE — 250N000013 HC RX MED GY IP 250 OP 250 PS 637: Performed by: EMERGENCY MEDICINE

## 2024-12-28 PROCEDURE — 70450 CT HEAD/BRAIN W/O DYE: CPT

## 2024-12-28 PROCEDURE — 82962 GLUCOSE BLOOD TEST: CPT

## 2024-12-28 PROCEDURE — 250N000009 HC RX 250: Performed by: EMERGENCY MEDICINE

## 2024-12-28 PROCEDURE — 80053 COMPREHEN METABOLIC PANEL: CPT | Performed by: EMERGENCY MEDICINE

## 2024-12-28 PROCEDURE — 83735 ASSAY OF MAGNESIUM: CPT | Performed by: EMERGENCY MEDICINE

## 2024-12-28 PROCEDURE — 36415 COLL VENOUS BLD VENIPUNCTURE: CPT | Performed by: EMERGENCY MEDICINE

## 2024-12-28 RX ORDER — ONDANSETRON 2 MG/ML
4 INJECTION INTRAMUSCULAR; INTRAVENOUS ONCE
Status: COMPLETED | OUTPATIENT
Start: 2024-12-28 | End: 2024-12-28

## 2024-12-28 RX ORDER — ONDANSETRON 4 MG/1
4 TABLET, ORALLY DISINTEGRATING ORAL EVERY 6 HOURS PRN
Qty: 10 TABLET | Refills: 0 | Status: SHIPPED | OUTPATIENT
Start: 2024-12-28

## 2024-12-28 RX ORDER — LIDOCAINE HYDROCHLORIDE 20 MG/ML
15 SOLUTION OROPHARYNGEAL ONCE
Status: COMPLETED | OUTPATIENT
Start: 2024-12-28 | End: 2024-12-28

## 2024-12-28 RX ORDER — ACETAMINOPHEN 500 MG
1000 TABLET ORAL ONCE
Status: COMPLETED | OUTPATIENT
Start: 2024-12-28 | End: 2024-12-28

## 2024-12-28 RX ADMIN — ACETAMINOPHEN 1000 MG: 500 TABLET, FILM COATED ORAL at 20:33

## 2024-12-28 RX ADMIN — SODIUM CHLORIDE 1000 ML: 9 INJECTION, SOLUTION INTRAVENOUS at 19:40

## 2024-12-28 RX ADMIN — ONDANSETRON 4 MG: 2 INJECTION, SOLUTION INTRAMUSCULAR; INTRAVENOUS at 18:38

## 2024-12-28 RX ADMIN — FAMOTIDINE 20 MG: 10 INJECTION, SOLUTION INTRAVENOUS at 19:39

## 2024-12-28 RX ADMIN — SODIUM CHLORIDE 1000 ML: 9 INJECTION, SOLUTION INTRAVENOUS at 18:41

## 2024-12-28 RX ADMIN — LIDOCAINE HYDROCHLORIDE 15 ML: 20 SOLUTION ORAL at 20:33

## 2024-12-28 ASSESSMENT — ACTIVITIES OF DAILY LIVING (ADL)
ADLS_ACUITY_SCORE: 52

## 2024-12-29 NOTE — ED PROVIDER NOTES
"  Emergency Department Note      History of Present Illness     Chief Complaint   Loss of Consciousness and Nausea & Vomiting      HPI   Nathan Bailey Jr. is a 64 year old male smoker with a history of IDDM who presents alone for the evaluation of vomiting and loss of consciousness.  He has had multiple episodes of vomiting over the last 2 days, unable to keep any food or water down.  He has occasionally seen bloody streaks but describes it as mostly acidic.  He has not had a bowel movement but does not feel constipated.  Today he was feeling so weak he did not even give himself insulin.  His girlfriend is also sick with vomiting and diarrhea so he drove himself to the emergency department. He \"barely made it\" walking in and then in triage he was lightheaded and then had a witnessed syncopal episode and vomiting.  He did hit his head.  He has a little bit of \"throbbing\" in his head but no neck pain.  He denies abdominal pain or fever.     Independent Historian   None    Review of External Notes   I reviewed records. [facility] is [facility]. He has a history of diabetes and substance abuse.    Past Medical History     Medical History and Problem List   Seizure  Diabetes mellitus type II   Hypertension  JAMIE  Depression  Anxiety  Cocaine substance abuse  Chronic back pain  PTSD  Cataract B/L  Carpal tunnel syndrome B/L  Alcohol abuse  Vertigo    Medications   amlodipine  atorvastatin  carvedilol  gabapentin  mirtazapine  prazosin  prednisolone  spironolactone  Tamsulosin  Insulin    Surgical History   Thoracotomy     Physical Exam     Patient Vitals for the past 24 hrs:   BP Temp Temp src Pulse Resp SpO2 Height Weight   12/28/24 2355 (!) 138/101 97.1  F (36.2  C) -- 101 19 95 % -- --   12/28/24 2345 -- -- -- 101 -- 95 % -- --   12/28/24 2340 -- -- -- -- 19 -- -- --   12/28/24 2339 -- -- -- 105 -- -- -- --   12/28/24 2330 (!) 138/101 -- -- 109 11 91 % -- --   12/28/24 2230 (!) 126/92 -- -- 111 13 -- -- --   12/28/24 " "2200 (!) 121/93 -- -- 112 13 -- -- --   12/28/24 2130 (!) 148/104 -- -- 112 12 -- -- --   12/28/24 2100 (!) 147/105 -- -- 110 11 -- -- --   12/28/24 1847 (!) 164/109 -- -- 112 -- -- -- --   12/28/24 1846 -- -- -- -- -- 94 % -- --   12/28/24 1845 -- -- -- -- 11 -- -- --   12/28/24 1842 -- 97.1  F (36.2  C) Temporal -- -- -- 1.803 m (5' 11\") 78 kg (172 lb)   12/28/24 1828 -- -- -- -- -- 97 % -- --   12/28/24 1827 -- -- -- -- 19 -- -- --   12/28/24 1821 (!) 178/103 -- -- (!) 121 -- -- -- --     Physical Exam  General: Well-developed and well-nourished. Well appearing middle aged  man. Cooperative.  Head:  Atraumatic.  Eyes:  Conjunctivae, lids, and sclerae are normal.  ENT:    Normal nose. Moist mucous membranes.  Neck:  Supple. Normal range of motion.  No midline tenderness.  No pain with full range of motion.  CV:  Tachycardic rate and regular rhythm. Normal heart sounds with no murmurs, rubs, or gallops detected.  Resp:  No respiratory distress. Clear to auscultation bilaterally without decreased breath sounds, wheezing, rales, or rhonchi.  GI:  Soft. Non-distended. Non-tender.    MS:  Normal ROM.   Skin:  Warm. Non-diaphoretic. No pallor.  Neuro:  Awake. A&Ox3. Normal strength.  Psych: Normal mood and affect. Normal speech.  Vitals reviewed.    Diagnostics     Lab Results   Labs Ordered and Resulted from Time of ED Arrival to Time of ED Departure   BASIC METABOLIC PANEL - Abnormal       Result Value    Sodium 138      Potassium 3.6      Chloride 84 (*)     Carbon Dioxide (CO2) 37 (*)     Anion Gap 17 (*)     Urea Nitrogen 24.0 (*)     Creatinine 1.75 (*)     GFR Estimate 43 (*)     Calcium 11.2 (*)     Glucose 179 (*)    CBC WITH PLATELETS AND DIFFERENTIAL - Abnormal    WBC Count 17.5 (*)     RBC Count 7.43 (*)     Hemoglobin 20.6 (*)     Hematocrit 61.5 (*)     MCV 83      MCH 27.7      MCHC 33.5      RDW 15.9 (*)     Platelet Count 361      % Neutrophils 80      % Lymphocytes 13      % Monocytes 5 "      % Eosinophils 0      % Basophils 1      % Immature Granulocytes 1      NRBCs per 100 WBC 0      Absolute Neutrophils 14.0 (*)     Absolute Lymphocytes 2.3      Absolute Monocytes 1.0      Absolute Eosinophils 0.0      Absolute Basophils 0.1      Absolute Immature Granulocytes 0.1      Absolute NRBCs 0.0     GLUCOSE BY METER - Abnormal    GLUCOSE BY METER POCT 189 (*)    HEPATIC FUNCTION PANEL - Abnormal    Protein Total 8.5 (*)     Albumin 4.8      Bilirubin Total 0.6      Alkaline Phosphatase 179 (*)     AST 19      ALT 31      Bilirubin Direct <0.20     LACTIC ACID WHOLE BLOOD WITH 1X REPEAT IN 2 HR WHEN >2 - Abnormal    Lactic Acid, Initial 3.4 (*)    TROPONIN T, HIGH SENSITIVITY - Abnormal    Troponin T, High Sensitivity 53 (*)    TROPONIN T, HIGH SENSITIVITY - Abnormal    Troponin T, High Sensitivity 44 (*)    BASIC METABOLIC PANEL - Abnormal    Sodium 142      Potassium 3.8      Chloride 93 (*)     Carbon Dioxide (CO2) 34 (*)     Anion Gap 15      Urea Nitrogen 23.1 (*)     Creatinine 1.56 (*)     GFR Estimate 49 (*)     Calcium 9.6      Glucose 144 (*)    LIPASE - Normal    Lipase 13     MAGNESIUM - Normal    Magnesium 2.2     LACTIC ACID WHOLE BLOOD - Normal    Lactic Acid 1.5         Imaging   CT Head w/o Contrast   Final Result   IMPRESSION:   1.  No CT evidence for acute intracranial process.   2.  Brain atrophy and presumed chronic microvascular ischemic changes as above.        EKG  Indication: Syncope  Time: 1822  Rate 125 bpm. MN interval 164. QRS duration 76. QT/QTc 304/438.   Sinus tachycardia  Left atrial enlargement  Inferior infarct, age undetermined  Cannot rule out anterior infarct, age undetermined  Abnormal ECG    No acute ST changes.  Rate increased as compared to prior, dated 10/29/2023.    Independent Interpretation   CT Head: No intracranial hemorrhage.    ED Course      Medications Administered   Medications   ondansetron (ZOFRAN) injection 4 mg (4 mg Intravenous $Given 12/28/24  1838)   sodium chloride 0.9% BOLUS 1,000 mL (0 mLs Intravenous Stopped 12/28/24 1930)   sodium chloride 0.9% BOLUS 1,000 mL (0 mLs Intravenous Stopped 12/28/24 2143)   famotidine (PEPCID) injection 20 mg (20 mg Intravenous $Given 12/28/24 1939)   lidocaine (viscous) (XYLOCAINE) 2 % solution 15 mL (15 mLs Mouth/Throat $Given 12/28/24 2033)   acetaminophen (TYLENOL) tablet 1,000 mg (1,000 mg Oral $Given 12/28/24 2033)     ED Course   ED Course as of 12/29/24 0033   Sat Dec 28, 2024   1855 I evaluated the patient and obtained history.    2344 I reevaluated the patient.  He is feeling much better.  He tolerated PO and trial of ambulation.       Additional Documentation  Social Determinants of Health: Healthcare Access/Compliance: [facility], Social Connections/Isolation: supportive girlfriend, and is a     Medical Decision Making / Diagnosis     CMS Diagnoses: Lactic acid elevated due to vomiting resulting in dehydration. At this time there are no signs of sepsis or septic shock    AMBROSIO Evans is a 64 year old man who has had 2 days of vomiting with worsening weakness.  His girlfriend has similar symptoms.  He drove himself to the emergency department and felt very weak walking in.  In triage he was lightheaded and had a witnessed syncopal episode as well as vomiting.  He did sustain head injury and has headache but no neck pain.  A cervical collar was placed in triage and I cleared it clinically.  His exam is significant for mild tachycardia and no abdominal tenderness.    Laboratory studies are significant for HERNÁN with creatinine increasing from most recent baseline of 1.3 to 1.75.  There is associated lactic acidosis which I suspect is due to dehydration alone as I do not find evidence of bacterial infection requiring antibiotics.  It should be noted hemoglobin is also elevated at 20.6 suggesting hemoconcentration.  After 2 L of IV fluids his lactate normalized and creatinine improved to 1.56.    EKG is  reassuring without acute ST changes or arrhythmias.  Troponin is elevated at 53 with repeat improving to 44.  I believe his syncope is likely due to orthostasis/dehydration and do not find a cardiac etiology to be likely.  Head CT is without intracranial hemorrhage.    In addition to fluids he was given Zofran, Pepcid, viscous lidocaine, and Tylenol.  On repeat evaluation he is feeling much better.  He tolerated PO and trial of ambulation and is comfortable with plan for discharge.  I suspect he has a self-limiting viral gastroenteritis and have suggested supportive care with lots of fluids and rest as well as Zofran as needed for nausea and vomiting.  He does understand to return if he has recurrent weakness or new symptoms and otherwise follow-up with primary care.  All questions answered.      Disposition   The patient was discharged.     Diagnosis     ICD-10-CM    1. Nausea and vomiting, unspecified vomiting type  R11.2       2. Dehydration  E86.0       3. Lactic acidosis  E87.20       4. Elevated troponin  R79.89       5. HERNÁN (acute kidney injury) (H)  N17.9       6. Leukocytosis, unspecified type  D72.829       7. Syncope, unspecified syncope type  R55            Discharge Medications   Discharge Medication List as of 12/28/2024 11:56 PM        START taking these medications    Details   ondansetron (ZOFRAN ODT) 4 MG ODT tab Take 1 tablet (4 mg) by mouth every 6 hours as needed for nausea or vomiting., Disp-10 tablet, R-0, E-Prescribe               Scribe Disclosure:  I, Dorys Joyce, am serving as a scribe at 6:54 PM on 12/28/2024 to document services personally performed by Clare Meléndez MD based on my observations and the provider's statements to me.        Clare Meléndez MD  01/01/25 2428

## 2024-12-29 NOTE — DISCHARGE INSTRUCTIONS
Lots of fluids including water, sugar-free Gatorade, and Pedialyte.  Zofran as needed for nausea and vomiting.  Lots of rest.  Lots of handwashing.  These illnesses typically run their course but if you have recurrent weakness/lightheadedness or any new symptoms you need to return to the emergency department.  Otherwise, follow-up with your primary care provider.

## 2024-12-29 NOTE — ED NOTES
"Pt arrived to ED with complaints of \"food poisoning\" reporting he had been vomiting all night. Pt fell in triage and hit the back of his head. Pt did lose consciousness. C-collar applied and pt brought to ED 28 where he is alert, reporting headache 5/10 and nausea 10/10. Pt is a diabetic.  "